# Patient Record
Sex: FEMALE | Race: OTHER | Employment: FULL TIME | URBAN - METROPOLITAN AREA
[De-identification: names, ages, dates, MRNs, and addresses within clinical notes are randomized per-mention and may not be internally consistent; named-entity substitution may affect disease eponyms.]

---

## 2017-03-06 ENCOUNTER — TELEPHONE (OUTPATIENT)
Dept: FAMILY MEDICINE CLINIC | Age: 32
End: 2017-03-06

## 2017-03-09 ENCOUNTER — OFFICE VISIT (OUTPATIENT)
Dept: FAMILY MEDICINE CLINIC | Age: 32
End: 2017-03-09

## 2017-03-09 VITALS
HEIGHT: 66 IN | BODY MASS INDEX: 34.55 KG/M2 | WEIGHT: 215 LBS | TEMPERATURE: 99.5 F | RESPIRATION RATE: 14 BRPM | SYSTOLIC BLOOD PRESSURE: 112 MMHG | DIASTOLIC BLOOD PRESSURE: 78 MMHG | HEART RATE: 74 BPM

## 2017-03-09 DIAGNOSIS — R68.89 FLU-LIKE SYMPTOMS: ICD-10-CM

## 2017-03-09 DIAGNOSIS — J01.90 ACUTE NON-RECURRENT SINUSITIS, UNSPECIFIED LOCATION: Primary | ICD-10-CM

## 2017-03-09 DIAGNOSIS — F99 INSOMNIA DUE TO OTHER MENTAL DISORDER: ICD-10-CM

## 2017-03-09 DIAGNOSIS — F51.05 INSOMNIA DUE TO OTHER MENTAL DISORDER: ICD-10-CM

## 2017-03-09 DIAGNOSIS — F17.200 TOBACCO USE DISORDER: ICD-10-CM

## 2017-03-09 LAB
INFLUENZA A ANTIBODY: NORMAL
INFLUENZA B ANTIBODY: NORMAL

## 2017-03-09 PROCEDURE — 87804 INFLUENZA ASSAY W/OPTIC: CPT | Performed by: FAMILY MEDICINE

## 2017-03-09 PROCEDURE — 99214 OFFICE O/P EST MOD 30 MIN: CPT | Performed by: FAMILY MEDICINE

## 2017-03-09 RX ORDER — TRAZODONE HYDROCHLORIDE 50 MG/1
50 TABLET ORAL NIGHTLY
Qty: 30 TABLET | Refills: 5 | Status: SHIPPED | OUTPATIENT
Start: 2017-03-09 | End: 2017-08-03 | Stop reason: SDUPTHER

## 2017-03-09 RX ORDER — CLARITHROMYCIN 500 MG/1
500 TABLET, COATED ORAL 2 TIMES DAILY
Qty: 20 TABLET | Refills: 0 | Status: SHIPPED | OUTPATIENT
Start: 2017-03-09 | End: 2017-03-19

## 2017-03-09 ASSESSMENT — ENCOUNTER SYMPTOMS
ALLERGIC/IMMUNOLOGIC NEGATIVE: 1
SINUS PRESSURE: 1
EYES NEGATIVE: 1
SORE THROAT: 1
GASTROINTESTINAL NEGATIVE: 1
ABDOMINAL PAIN: 0
SHORTNESS OF BREATH: 0
HOARSE VOICE: 1
SWOLLEN GLANDS: 0
COUGH: 1

## 2017-03-09 ASSESSMENT — PATIENT HEALTH QUESTIONNAIRE - PHQ9
2. FEELING DOWN, DEPRESSED OR HOPELESS: 0
SUM OF ALL RESPONSES TO PHQ9 QUESTIONS 1 & 2: 0
1. LITTLE INTEREST OR PLEASURE IN DOING THINGS: 0
SUM OF ALL RESPONSES TO PHQ QUESTIONS 1-9: 0

## 2017-08-03 ENCOUNTER — OFFICE VISIT (OUTPATIENT)
Dept: FAMILY MEDICINE CLINIC | Age: 32
End: 2017-08-03

## 2017-08-03 VITALS
OXYGEN SATURATION: 98 % | RESPIRATION RATE: 16 BRPM | BODY MASS INDEX: 35.03 KG/M2 | WEIGHT: 218 LBS | TEMPERATURE: 98.7 F | HEART RATE: 79 BPM | HEIGHT: 66 IN | DIASTOLIC BLOOD PRESSURE: 80 MMHG | SYSTOLIC BLOOD PRESSURE: 110 MMHG

## 2017-08-03 DIAGNOSIS — R42 VERTIGO: ICD-10-CM

## 2017-08-03 DIAGNOSIS — F51.01 PRIMARY INSOMNIA: Primary | ICD-10-CM

## 2017-08-03 DIAGNOSIS — F17.200 TOBACCO USE DISORDER: ICD-10-CM

## 2017-08-03 PROCEDURE — 99214 OFFICE O/P EST MOD 30 MIN: CPT | Performed by: FAMILY MEDICINE

## 2017-08-03 RX ORDER — TRAZODONE HYDROCHLORIDE 50 MG/1
50 TABLET ORAL NIGHTLY
Qty: 30 TABLET | Refills: 5 | Status: SHIPPED | OUTPATIENT
Start: 2017-08-03 | End: 2018-02-13 | Stop reason: SDUPTHER

## 2017-08-03 RX ORDER — MECLIZINE HYDROCHLORIDE 25 MG/1
25 TABLET ORAL 3 TIMES DAILY PRN
Qty: 60 TABLET | Refills: 5 | Status: SHIPPED | OUTPATIENT
Start: 2017-08-03 | End: 2018-02-13 | Stop reason: SDUPTHER

## 2017-08-03 RX ORDER — VARENICLINE TARTRATE 1 MG/1
1 TABLET, FILM COATED ORAL 2 TIMES DAILY
Qty: 60 TABLET | Refills: 2 | Status: SHIPPED | OUTPATIENT
Start: 2017-08-03 | End: 2018-08-29 | Stop reason: CLARIF

## 2017-08-03 ASSESSMENT — ENCOUNTER SYMPTOMS
GASTROINTESTINAL NEGATIVE: 1
ALLERGIC/IMMUNOLOGIC NEGATIVE: 1
RESPIRATORY NEGATIVE: 1
EYES NEGATIVE: 1
ABDOMINAL PAIN: 0

## 2017-10-31 ENCOUNTER — OFFICE VISIT (OUTPATIENT)
Dept: FAMILY MEDICINE CLINIC | Age: 32
End: 2017-10-31

## 2017-10-31 VITALS
BODY MASS INDEX: 35.68 KG/M2 | HEART RATE: 78 BPM | DIASTOLIC BLOOD PRESSURE: 66 MMHG | SYSTOLIC BLOOD PRESSURE: 112 MMHG | RESPIRATION RATE: 14 BRPM | WEIGHT: 222 LBS | HEIGHT: 66 IN | TEMPERATURE: 98.7 F

## 2017-10-31 DIAGNOSIS — H81.10 BENIGN PAROXYSMAL VERTIGO, UNSPECIFIED LATERALITY: ICD-10-CM

## 2017-10-31 DIAGNOSIS — J01.90 ACUTE NON-RECURRENT SINUSITIS, UNSPECIFIED LOCATION: Primary | ICD-10-CM

## 2017-10-31 DIAGNOSIS — J02.9 SORE THROAT: ICD-10-CM

## 2017-10-31 LAB — S PYO AG THROAT QL: NORMAL

## 2017-10-31 PROCEDURE — 87880 STREP A ASSAY W/OPTIC: CPT | Performed by: FAMILY MEDICINE

## 2017-10-31 PROCEDURE — 99213 OFFICE O/P EST LOW 20 MIN: CPT | Performed by: FAMILY MEDICINE

## 2017-10-31 PROCEDURE — 4004F PT TOBACCO SCREEN RCVD TLK: CPT | Performed by: FAMILY MEDICINE

## 2017-10-31 PROCEDURE — G8417 CALC BMI ABV UP PARAM F/U: HCPCS | Performed by: FAMILY MEDICINE

## 2017-10-31 PROCEDURE — G8484 FLU IMMUNIZE NO ADMIN: HCPCS | Performed by: FAMILY MEDICINE

## 2017-10-31 PROCEDURE — G8428 CUR MEDS NOT DOCUMENT: HCPCS | Performed by: FAMILY MEDICINE

## 2017-10-31 RX ORDER — DESOGESTREL-ETHINYL ESTRADIOL 0.15-0.03
TABLET ORAL
Refills: 11 | COMMUNITY
Start: 2017-09-24 | End: 2018-08-29

## 2017-10-31 RX ORDER — CEFDINIR 300 MG/1
300 CAPSULE ORAL 2 TIMES DAILY
Qty: 20 CAPSULE | Refills: 0 | Status: SHIPPED | OUTPATIENT
Start: 2017-10-31 | End: 2017-11-10

## 2017-10-31 ASSESSMENT — ENCOUNTER SYMPTOMS
SHORTNESS OF BREATH: 0
HOARSE VOICE: 0
COUGH: 0
SINUS PRESSURE: 1
SORE THROAT: 1
SINUS COMPLAINT: 1
SWOLLEN GLANDS: 1

## 2017-10-31 NOTE — PROGRESS NOTES
(ANTIVERT) 25 MG tablet Take 1 tablet by mouth 3 times daily as needed for Dizziness 60 tablet 5    varenicline (CHANTIX CONTINUING MONTH DREW) 1 MG tablet Take 1 tablet by mouth 2 times daily 60 tablet 2    ibuprofen (ADVIL;MOTRIN) 800 MG tablet Take 1 tablet by mouth every 8 hours as needed for Pain 90 tablet 11    norgestimate-ethinyl estradiol (ORTHO-CYCLEN) 0.25-35 MG-MCG per tablet Take 1 tablet by mouth daily      acyclovir (ZOVIRAX) 400 MG tablet Take 400 mg by mouth 3 times daily. No current facility-administered medications on file prior to visit. Objective    Vitals:    10/31/17 0939   BP: 112/66   Pulse: 78   Resp: 14   Temp: 98.7 °F (37.1 °C)   Weight: 222 lb (100.7 kg)   Height: 5' 6\" (1.676 m)     Physical Exam   Constitutional: Vital signs are normal. She appears well-developed and well-nourished. No distress. HENT:   Head: Normocephalic and atraumatic. Right Ear: External ear and ear canal normal. Tympanic membrane is not erythematous and not retracted. A middle ear effusion is present. Left Ear: External ear and ear canal normal. Tympanic membrane is not erythematous and not retracted. A middle ear effusion is present. Nose: Mucosal edema and rhinorrhea present. No septal deviation. Right sinus exhibits maxillary sinus tenderness and frontal sinus tenderness. Left sinus exhibits maxillary sinus tenderness and frontal sinus tenderness. Mouth/Throat: Uvula is midline and mucous membranes are normal. Uvula swelling present. Posterior oropharyngeal edema and posterior oropharyngeal erythema present. Oropharyngeal exudate: postnasal drainage. Eyes: Conjunctivae, EOM and lids are normal. Pupils are equal, round, and reactive to light. Neck: Trachea normal and normal range of motion. Neck supple. No JVD present. Carotid bruit is not present. No tracheal deviation present. No thyroid mass and no thyromegaly present.    Cardiovascular: Normal rate, regular rhythm, S1 normal, S2 normal, normal heart sounds, intact distal pulses and normal pulses. Pulmonary/Chest: Effort normal and breath sounds normal. No respiratory distress. She has no decreased breath sounds. She has no wheezes. She has no rhonchi. She has no rales. She exhibits no tenderness and no deformity. Abdominal: Soft. Normal appearance and bowel sounds are normal. She exhibits no distension. There is no splenomegaly or hepatomegaly. There is no tenderness. There is no rigidity, no rebound, no guarding and no CVA tenderness. No hernia. Lymphadenopathy:     She has no cervical adenopathy. Neurological: She is alert. She has normal strength. No cranial nerve deficit or sensory deficit. Coordination and gait normal.   Skin: Skin is warm, dry and intact. No rash noted. No erythema. Nursing note and vitals reviewed. Assessment & Plan   1. Acute non-recurrent sinusitis, unspecified location  cefdinir (OMNICEF) 300 MG capsule   2. Sore throat  POCT rapid strep A   3. Benign paroxysmal vertigo, unspecified laterality       Orders Placed This Encounter   Procedures    POCT rapid strep A     Orders Placed This Encounter   Medications    cefdinir (OMNICEF) 300 MG capsule     Sig: Take 1 capsule by mouth 2 times daily for 10 days     Dispense:  20 capsule     Refill:  0   Recommend OTC neilmed neti pot/ sinus rinse at least 4 times daily follow instructions on package  Foster maneuver on youtube  There are no discontinued medications. Ready to quit: Not Answered  Counseling given: Not Answered      Return if symptoms worsen or fail to improve.     Avtar Virk, DO

## 2018-02-13 ENCOUNTER — OFFICE VISIT (OUTPATIENT)
Dept: FAMILY MEDICINE CLINIC | Age: 33
End: 2018-02-13
Payer: COMMERCIAL

## 2018-02-13 VITALS
OXYGEN SATURATION: 98 % | HEART RATE: 78 BPM | RESPIRATION RATE: 16 BRPM | BODY MASS INDEX: 32.3 KG/M2 | DIASTOLIC BLOOD PRESSURE: 84 MMHG | TEMPERATURE: 98.1 F | WEIGHT: 201 LBS | SYSTOLIC BLOOD PRESSURE: 122 MMHG | HEIGHT: 66 IN

## 2018-02-13 DIAGNOSIS — F51.01 PRIMARY INSOMNIA: ICD-10-CM

## 2018-02-13 DIAGNOSIS — R10.11 RUQ PAIN: Primary | ICD-10-CM

## 2018-02-13 DIAGNOSIS — R42 VERTIGO: ICD-10-CM

## 2018-02-13 DIAGNOSIS — R11.2 NON-INTRACTABLE VOMITING WITH NAUSEA, UNSPECIFIED VOMITING TYPE: ICD-10-CM

## 2018-02-13 DIAGNOSIS — R10.11 RUQ PAIN: ICD-10-CM

## 2018-02-13 DIAGNOSIS — F17.200 TOBACCO USE DISORDER: ICD-10-CM

## 2018-02-13 LAB
ALBUMIN SERPL-MCNC: 4.2 G/DL (ref 3.9–4.9)
ALP BLD-CCNC: 86 U/L (ref 40–130)
ALT SERPL-CCNC: 19 U/L (ref 0–33)
AMYLASE: 60 U/L (ref 28–100)
ANION GAP SERPL CALCULATED.3IONS-SCNC: 14 MEQ/L (ref 7–13)
AST SERPL-CCNC: 11 U/L (ref 0–35)
BASOPHILS ABSOLUTE: 0.1 K/UL (ref 0–0.2)
BASOPHILS RELATIVE PERCENT: 1.2 %
BILIRUB SERPL-MCNC: 0.3 MG/DL (ref 0–1.2)
BUN BLDV-MCNC: 7 MG/DL (ref 6–20)
CALCIUM SERPL-MCNC: 8.9 MG/DL (ref 8.6–10.2)
CHLORIDE BLD-SCNC: 103 MEQ/L (ref 98–107)
CO2: 23 MEQ/L (ref 22–29)
CREAT SERPL-MCNC: 0.86 MG/DL (ref 0.5–0.9)
EOSINOPHILS ABSOLUTE: 0 K/UL (ref 0–0.7)
EOSINOPHILS RELATIVE PERCENT: 0.3 %
GFR AFRICAN AMERICAN: >60
GFR NON-AFRICAN AMERICAN: >60
GLOBULIN: 2.6 G/DL (ref 2.3–3.5)
GLUCOSE BLD-MCNC: 88 MG/DL (ref 74–109)
HCT VFR BLD CALC: 44.1 % (ref 37–47)
HEMOGLOBIN: 14.7 G/DL (ref 12–16)
LIPASE: 24 U/L (ref 13–60)
LYMPHOCYTES ABSOLUTE: 3 K/UL (ref 1–4.8)
LYMPHOCYTES RELATIVE PERCENT: 34.3 %
MCH RBC QN AUTO: 32 PG (ref 27–31.3)
MCHC RBC AUTO-ENTMCNC: 33.4 % (ref 33–37)
MCV RBC AUTO: 95.6 FL (ref 82–100)
MONOCYTES ABSOLUTE: 0.7 K/UL (ref 0.2–0.8)
MONOCYTES RELATIVE PERCENT: 8.5 %
NEUTROPHILS ABSOLUTE: 4.9 K/UL (ref 1.4–6.5)
NEUTROPHILS RELATIVE PERCENT: 55.7 %
PDW BLD-RTO: 12.8 % (ref 11.5–14.5)
PLATELET # BLD: 273 K/UL (ref 130–400)
POTASSIUM SERPL-SCNC: 4.7 MEQ/L (ref 3.5–5.1)
RBC # BLD: 4.61 M/UL (ref 4.2–5.4)
SODIUM BLD-SCNC: 140 MEQ/L (ref 132–144)
TOTAL PROTEIN: 6.8 G/DL (ref 6.4–8.1)
WBC # BLD: 8.7 K/UL (ref 4.8–10.8)

## 2018-02-13 PROCEDURE — 4004F PT TOBACCO SCREEN RCVD TLK: CPT | Performed by: FAMILY MEDICINE

## 2018-02-13 PROCEDURE — 99214 OFFICE O/P EST MOD 30 MIN: CPT | Performed by: FAMILY MEDICINE

## 2018-02-13 PROCEDURE — G8427 DOCREV CUR MEDS BY ELIG CLIN: HCPCS | Performed by: FAMILY MEDICINE

## 2018-02-13 PROCEDURE — G8417 CALC BMI ABV UP PARAM F/U: HCPCS | Performed by: FAMILY MEDICINE

## 2018-02-13 PROCEDURE — G8484 FLU IMMUNIZE NO ADMIN: HCPCS | Performed by: FAMILY MEDICINE

## 2018-02-13 RX ORDER — MECLIZINE HYDROCHLORIDE 25 MG/1
25 TABLET ORAL 3 TIMES DAILY PRN
Qty: 60 TABLET | Refills: 5 | Status: SHIPPED | OUTPATIENT
Start: 2018-02-13 | End: 2018-08-29 | Stop reason: CLARIF

## 2018-02-13 RX ORDER — TRAZODONE HYDROCHLORIDE 50 MG/1
50 TABLET ORAL NIGHTLY
Qty: 30 TABLET | Refills: 5 | Status: SHIPPED | OUTPATIENT
Start: 2018-02-13 | End: 2018-08-16 | Stop reason: SDUPTHER

## 2018-02-13 RX ORDER — IBUPROFEN 800 MG/1
800 TABLET ORAL EVERY 8 HOURS PRN
Qty: 90 TABLET | Refills: 11 | Status: SHIPPED | OUTPATIENT
Start: 2018-02-13 | End: 2018-08-29 | Stop reason: CLARIF

## 2018-02-13 RX ORDER — ONDANSETRON 4 MG/1
4 TABLET, FILM COATED ORAL DAILY PRN
Qty: 30 TABLET | Refills: 2 | Status: SHIPPED | OUTPATIENT
Start: 2018-02-13 | End: 2018-08-29 | Stop reason: CLARIF

## 2018-02-13 RX ORDER — NORGESTIMATE AND ETHINYL ESTRADIOL 0.25-0.035
1 KIT ORAL DAILY
Qty: 1 PACKET | Refills: 1 | Status: SHIPPED | OUTPATIENT
Start: 2018-02-13 | End: 2018-06-19 | Stop reason: SDUPTHER

## 2018-02-13 ASSESSMENT — ENCOUNTER SYMPTOMS
EYES NEGATIVE: 1
ALLERGIC/IMMUNOLOGIC NEGATIVE: 1
VISUAL CHANGE: 0
NAUSEA: 1
VOMITING: 1
CHANGE IN BOWEL HABIT: 0
SWOLLEN GLANDS: 0
COUGH: 0
RESPIRATORY NEGATIVE: 1
SORE THROAT: 0
ABDOMINAL PAIN: 1

## 2018-02-13 NOTE — PROGRESS NOTES
Differential    Comprehensive Metabolic Panel    Amylase    Lipase   5. Tobacco use disorder       Orders Placed This Encounter   Procedures    US GALLBLADDER RUQ     Standing Status:   Future     Standing Expiration Date:   2/13/2019    CBC Auto Differential     Standing Status:   Future     Standing Expiration Date:   2/13/2019    Comprehensive Metabolic Panel     Standing Status:   Future     Standing Expiration Date:   2/13/2019    Amylase     Standing Status:   Future     Standing Expiration Date:   2/13/2019    Lipase     Standing Status:   Future     Standing Expiration Date:   2/13/2019     Orders Placed This Encounter   Medications    traZODone (DESYREL) 50 MG tablet     Sig: Take 1 tablet by mouth nightly     Dispense:  30 tablet     Refill:  5    meclizine (ANTIVERT) 25 MG tablet     Sig: Take 1 tablet by mouth 3 times daily as needed for Dizziness     Dispense:  60 tablet     Refill:  5    norgestimate-ethinyl estradiol (ORTHO-CYCLEN) 0.25-35 MG-MCG per tablet     Sig: Take 1 tablet by mouth daily     Dispense:  1 packet     Refill:  1    ondansetron (ZOFRAN) 4 MG tablet     Sig: Take 1 tablet by mouth daily as needed for Nausea or Vomiting     Dispense:  30 tablet     Refill:  2     Medications Discontinued During This Encounter   Medication Reason    traZODone (DESYREL) 50 MG tablet Reorder    meclizine (ANTIVERT) 25 MG tablet Reorder    norgestimate-ethinyl estradiol (ORTHO-CYCLEN) 0.25-35 MG-MCG per tablet Reorder   smoking cessation discussed  Maintain clear liquids for at least 24 hrs after last vomiting episode then advance diet slowly as tolerated. Await labs will arrange for f/u as necessary but anticipate resolution over next few days    Ready to quit: No  Counseling given: Yes      Return in about 1 year (around 2/13/2019).     Jose Gallagher DO

## 2018-02-15 ENCOUNTER — HOSPITAL ENCOUNTER (OUTPATIENT)
Dept: ULTRASOUND IMAGING | Age: 33
Discharge: HOME | End: 2018-02-17
Payer: COMMERCIAL

## 2018-02-15 DIAGNOSIS — R11.2 NON-INTRACTABLE VOMITING WITH NAUSEA, UNSPECIFIED VOMITING TYPE: ICD-10-CM

## 2018-02-15 DIAGNOSIS — R10.11 RUQ PAIN: ICD-10-CM

## 2018-02-15 PROCEDURE — 76705 ECHO EXAM OF ABDOMEN: CPT

## 2018-06-19 RX ORDER — NORGESTIMATE AND ETHINYL ESTRADIOL 0.25-0.035
1 KIT ORAL DAILY
Qty: 1 PACKET | Refills: 1 | Status: SHIPPED | OUTPATIENT
Start: 2018-06-19 | End: 2018-08-29 | Stop reason: SDUPTHER

## 2018-06-19 RX ORDER — NORGESTIMATE AND ETHINYL ESTRADIOL 0.25-0.035
1 KIT ORAL DAILY
Qty: 1 PACKET | Refills: 1 | Status: CANCELLED | OUTPATIENT
Start: 2018-06-19

## 2018-08-16 DIAGNOSIS — F51.01 PRIMARY INSOMNIA: ICD-10-CM

## 2018-08-16 RX ORDER — TRAZODONE HYDROCHLORIDE 50 MG/1
50 TABLET ORAL NIGHTLY
Qty: 14 TABLET | Refills: 0 | Status: SHIPPED | OUTPATIENT
Start: 2018-08-16 | End: 2018-08-29 | Stop reason: SDUPTHER

## 2018-08-16 NOTE — TELEPHONE ENCOUNTER
Pt out of med. Has appt 8/29/18. Asking for short supply to get thru.     Pharm: [facility]

## 2018-08-17 RX ORDER — TRAZODONE HYDROCHLORIDE 50 MG/1
50 TABLET ORAL NIGHTLY
Qty: 30 TABLET | OUTPATIENT
Start: 2018-08-17

## 2018-08-20 ENCOUNTER — TELEPHONE (OUTPATIENT)
Dept: FAMILY MEDICINE CLINIC | Age: 33
End: 2018-08-20

## 2018-08-29 ENCOUNTER — OFFICE VISIT (OUTPATIENT)
Dept: FAMILY MEDICINE CLINIC | Age: 33
End: 2018-08-29
Payer: COMMERCIAL

## 2018-08-29 VITALS
HEART RATE: 78 BPM | BODY MASS INDEX: 36.16 KG/M2 | TEMPERATURE: 98.2 F | WEIGHT: 225 LBS | HEIGHT: 66 IN | SYSTOLIC BLOOD PRESSURE: 120 MMHG | RESPIRATION RATE: 16 BRPM | DIASTOLIC BLOOD PRESSURE: 70 MMHG

## 2018-08-29 DIAGNOSIS — F51.01 PRIMARY INSOMNIA: ICD-10-CM

## 2018-08-29 DIAGNOSIS — Z00.00 WELL ADULT EXAM: Primary | ICD-10-CM

## 2018-08-29 DIAGNOSIS — F17.200 TOBACCO USE DISORDER: ICD-10-CM

## 2018-08-29 DIAGNOSIS — N94.6 DYSMENORRHEA: ICD-10-CM

## 2018-08-29 PROCEDURE — 99395 PREV VISIT EST AGE 18-39: CPT | Performed by: FAMILY MEDICINE

## 2018-08-29 RX ORDER — TRAZODONE HYDROCHLORIDE 50 MG/1
50 TABLET ORAL NIGHTLY
Qty: 30 TABLET | Refills: 11 | Status: SHIPPED | OUTPATIENT
Start: 2018-08-29

## 2018-08-29 RX ORDER — NORGESTIMATE AND ETHINYL ESTRADIOL 0.25-0.035
1 KIT ORAL DAILY
Qty: 3 PACKET | Refills: 3 | Status: SHIPPED | OUTPATIENT
Start: 2018-08-29 | End: 2019-01-24 | Stop reason: SDUPTHER

## 2018-08-29 ASSESSMENT — PATIENT HEALTH QUESTIONNAIRE - PHQ9
1. LITTLE INTEREST OR PLEASURE IN DOING THINGS: 0
2. FEELING DOWN, DEPRESSED OR HOPELESS: 0
SUM OF ALL RESPONSES TO PHQ QUESTIONS 1-9: 0
SUM OF ALL RESPONSES TO PHQ9 QUESTIONS 1 & 2: 0
SUM OF ALL RESPONSES TO PHQ QUESTIONS 1-9: 0

## 2018-08-29 ASSESSMENT — ENCOUNTER SYMPTOMS
EYES NEGATIVE: 1
GASTROINTESTINAL NEGATIVE: 1
ALLERGIC/IMMUNOLOGIC NEGATIVE: 1
RESPIRATORY NEGATIVE: 1

## 2018-08-29 NOTE — PROGRESS NOTES
[address] [place], 28 y.o. female presents today with:  Chief Complaint   Patient presents with    Medication Refill     csm       Patient comes to the office today for routine yearly recheck but also needs refills on medications for dysmenorrhea and insomnia. She denies any significant side effects to medication and she reports they continue to work well. She continues to smoke about the same and does not state any desire to quit smoking. She states understanding and acceptance of risks. Review of Systems   Constitutional: Negative. HENT: Negative. Eyes: Negative. Respiratory: Negative. Cardiovascular: Negative. Gastrointestinal: Negative. Endocrine: Negative. Genitourinary: Negative. Musculoskeletal: Negative. Skin: Negative. Allergic/Immunologic: Negative. Neurological: Negative. Hematological: Negative. Psychiatric/Behavioral: Negative. No past medical history on file. Past Surgical History:   Procedure Laterality Date    TONSILLECTOMY  11     Social History     Social History    Marital status: Single     Spouse name: N/A    Number of children: N/A    Years of education: N/A     Occupational History    Not on file. Social History Main Topics    Smoking status: Current Every Day Smoker     Packs/day: 0.25     Types: Cigarettes    Smokeless tobacco: Never Used    Alcohol use Yes    Drug use: Unknown    Sexual activity: Not on file     Other Topics Concern    Not on file     Social History Narrative    No narrative on file     Family History   Problem Relation Age of Onset    Cancer Father         liver     No Known Allergies  No current outpatient prescriptions on file prior to visit. No current facility-administered medications on file prior to visit.         Objective    Vitals:    08/29/18 1545   BP: 120/70   Pulse: 78   Resp: 16   Temp: 98.2 °F (36.8 °C)   TempSrc: Tympanic   Weight: 225 lb (102.1 kg)   Height: 5' 6\" strength. No cranial nerve deficit or sensory deficit. Coordination and gait normal.   Skin: Skin is warm, dry and intact. No rash noted. No erythema. Psychiatric: She has a normal mood and affect. Her speech is normal and behavior is normal. Judgment and thought content normal. Her affect is not angry and not inappropriate. Cognition and memory are normal.   Nursing note and vitals reviewed. Assessment & Plan    Diagnosis Orders   1. Well adult exam     2. Primary insomnia  traZODone (DESYREL) 50 MG tablet   3. Dysmenorrhea  norgestimate-ethinyl estradiol (ORTHO-CYCLEN) 0.25-35 MG-MCG per tablet   4. Tobacco use disorder       No orders of the defined types were placed in this encounter. Orders Placed This Encounter   Medications    traZODone (DESYREL) 50 MG tablet     Sig: Take 1 tablet by mouth nightly     Dispense:  30 tablet     Refill:  11    norgestimate-ethinyl estradiol (ORTHO-CYCLEN) 0.25-35 MG-MCG per tablet     Sig: Take 1 tablet by mouth daily     Dispense:  3 packet     Refill:  3     Medications Discontinued During This Encounter   Medication Reason    acyclovir (ZOVIRAX) 400 MG tablet ERROR    ibuprofen (ADVIL;MOTRIN) 800 MG tablet ERROR    meclizine (ANTIVERT) 25 MG tablet ERROR    ondansetron (ZOFRAN) 4 MG tablet ERROR    varenicline (CHANTIX CONTINUING MONTH DREW) 1 MG tablet ERROR    traZODone (DESYREL) 50 MG tablet REORDER    norgestimate-ethinyl estradiol (ORTHO-CYCLEN) 0.25-35 MG-MCG per tablet REORDER    EMOQUETTE 0.15-30 MG-MCG per tablet LIST CLEANUP   smoking cessation discussed-patient stated understanding and acceptance of risks of continued tobacco use    Ready to quit: Not Answered  Counseling given: Not Answered      Return in about 1 year (around 8/29/2019).     Elvin Common, DO

## 2019-01-24 DIAGNOSIS — N94.6 DYSMENORRHEA: ICD-10-CM

## 2019-01-24 RX ORDER — NORGESTIMATE AND ETHINYL ESTRADIOL 0.25-0.035
1 KIT ORAL DAILY
Qty: 3 PACKET | Refills: 2 | Status: SHIPPED | OUTPATIENT
Start: 2019-01-24 | End: 2019-06-05 | Stop reason: SDUPTHER

## 2019-02-21 ENCOUNTER — OFFICE VISIT (OUTPATIENT)
Dept: FAMILY MEDICINE CLINIC | Age: 34
End: 2019-02-21
Payer: COMMERCIAL

## 2019-02-21 VITALS
BODY MASS INDEX: 36.32 KG/M2 | DIASTOLIC BLOOD PRESSURE: 80 MMHG | RESPIRATION RATE: 16 BRPM | HEART RATE: 89 BPM | WEIGHT: 226 LBS | TEMPERATURE: 99 F | HEIGHT: 66 IN | OXYGEN SATURATION: 98 % | SYSTOLIC BLOOD PRESSURE: 110 MMHG

## 2019-02-21 DIAGNOSIS — J01.90 ACUTE NON-RECURRENT SINUSITIS, UNSPECIFIED LOCATION: Primary | ICD-10-CM

## 2019-02-21 DIAGNOSIS — F17.200 TOBACCO USE DISORDER: ICD-10-CM

## 2019-02-21 PROCEDURE — 99213 OFFICE O/P EST LOW 20 MIN: CPT | Performed by: FAMILY MEDICINE

## 2019-02-21 RX ORDER — MECLIZINE HYDROCHLORIDE 25 MG/1
TABLET ORAL
Refills: 4 | COMMUNITY
Start: 2019-01-26 | End: 2019-03-19 | Stop reason: SDUPTHER

## 2019-02-21 RX ORDER — BENZONATATE 100 MG/1
100 CAPSULE ORAL 3 TIMES DAILY PRN
Qty: 30 CAPSULE | Refills: 0 | Status: SHIPPED | OUTPATIENT
Start: 2019-02-21 | End: 2019-02-28

## 2019-02-21 RX ORDER — IBUPROFEN 800 MG/1
TABLET ORAL
COMMUNITY
Start: 2019-01-26 | End: 2019-02-21 | Stop reason: SDUPTHER

## 2019-02-21 RX ORDER — CLARITHROMYCIN 500 MG/1
500 TABLET, COATED ORAL 2 TIMES DAILY
Qty: 20 TABLET | Refills: 0 | Status: SHIPPED | OUTPATIENT
Start: 2019-02-21 | End: 2019-03-03

## 2019-02-21 ASSESSMENT — ENCOUNTER SYMPTOMS
RHINORRHEA: 1
SHORTNESS OF BREATH: 0
WHEEZING: 0
COUGH: 1
SORE THROAT: 0
HEARTBURN: 0
HEMOPTYSIS: 0
GASTROINTESTINAL NEGATIVE: 1

## 2019-02-21 ASSESSMENT — PATIENT HEALTH QUESTIONNAIRE - PHQ9
SUM OF ALL RESPONSES TO PHQ QUESTIONS 1-9: 0
DEPRESSION UNABLE TO ASSESS: FUNCTIONAL CAPACITY MOTIVATION LIMITS ACCURACY
SUM OF ALL RESPONSES TO PHQ QUESTIONS 1-9: 0
2. FEELING DOWN, DEPRESSED OR HOPELESS: 0
1. LITTLE INTEREST OR PLEASURE IN DOING THINGS: 0
SUM OF ALL RESPONSES TO PHQ9 QUESTIONS 1 & 2: 0

## 2019-02-22 RX ORDER — IBUPROFEN 800 MG/1
TABLET ORAL
Qty: 90 TABLET | Refills: 0 | Status: SHIPPED | OUTPATIENT
Start: 2019-02-22

## 2019-03-19 RX ORDER — MECLIZINE HYDROCHLORIDE 25 MG/1
TABLET ORAL
Qty: 30 TABLET | Refills: 4 | Status: SHIPPED | OUTPATIENT
Start: 2019-03-19

## 2019-04-16 ENCOUNTER — HOSPITAL ENCOUNTER (EMERGENCY)
Age: 34
Discharge: HOME | End: 2019-04-16
Payer: COMMERCIAL

## 2019-04-16 VITALS
RESPIRATION RATE: 16 BRPM | BODY MASS INDEX: 34.55 KG/M2 | WEIGHT: 215 LBS | HEART RATE: 82 BPM | DIASTOLIC BLOOD PRESSURE: 60 MMHG | HEIGHT: 66 IN | TEMPERATURE: 98.6 F | OXYGEN SATURATION: 97 % | SYSTOLIC BLOOD PRESSURE: 94 MMHG

## 2019-04-16 DIAGNOSIS — R19.7 DIARRHEA, UNSPECIFIED TYPE: ICD-10-CM

## 2019-04-16 DIAGNOSIS — R11.2 NON-INTRACTABLE VOMITING WITH NAUSEA, UNSPECIFIED VOMITING TYPE: Primary | ICD-10-CM

## 2019-04-16 LAB
ALBUMIN SERPL-MCNC: 3.6 G/DL (ref 3.5–4.6)
ALP BLD-CCNC: 67 U/L (ref 40–130)
ALT SERPL-CCNC: 15 U/L (ref 0–33)
ANION GAP SERPL CALCULATED.3IONS-SCNC: 11 MEQ/L (ref 9–15)
AST SERPL-CCNC: 12 U/L (ref 0–35)
BACTERIA: NEGATIVE /HPF
BASOPHILS ABSOLUTE: 0 K/UL (ref 0–0.2)
BASOPHILS RELATIVE PERCENT: 0.1 %
BILIRUB SERPL-MCNC: 0.6 MG/DL (ref 0.2–0.7)
BILIRUBIN URINE: NEGATIVE
BLOOD, URINE: ABNORMAL
BUN BLDV-MCNC: 16 MG/DL (ref 6–20)
CALCIUM SERPL-MCNC: 7.7 MG/DL (ref 8.5–9.9)
CHLORIDE BLD-SCNC: 108 MEQ/L (ref 95–107)
CLARITY: CLEAR
CO2: 23 MEQ/L (ref 20–31)
COLOR: YELLOW
CREAT SERPL-MCNC: 0.76 MG/DL (ref 0.5–0.9)
EOSINOPHILS ABSOLUTE: 0 K/UL (ref 0–0.7)
EOSINOPHILS RELATIVE PERCENT: 0.1 %
EPITHELIAL CELLS, UA: ABNORMAL /HPF (ref 0–5)
GFR AFRICAN AMERICAN: >60
GFR NON-AFRICAN AMERICAN: >60
GLOBULIN: 2.6 G/DL (ref 2.3–3.5)
GLUCOSE BLD-MCNC: 121 MG/DL (ref 70–99)
GLUCOSE URINE: NEGATIVE MG/DL
HCT VFR BLD CALC: 46.7 % (ref 37–47)
HEMOGLOBIN: 16.1 G/DL (ref 12–16)
HYALINE CASTS: ABNORMAL /HPF (ref 0–5)
KETONES, URINE: 15 MG/DL
LEUKOCYTE ESTERASE, URINE: NEGATIVE
LYMPHOCYTES ABSOLUTE: 0.5 K/UL (ref 1–4.8)
LYMPHOCYTES RELATIVE PERCENT: 3.4 %
MCH RBC QN AUTO: 32.1 PG (ref 27–31.3)
MCHC RBC AUTO-ENTMCNC: 34.5 % (ref 33–37)
MCV RBC AUTO: 93.2 FL (ref 82–100)
MONOCYTES ABSOLUTE: 0.4 K/UL (ref 0.2–0.8)
MONOCYTES RELATIVE PERCENT: 2.3 %
NEUTROPHILS ABSOLUTE: 14.6 K/UL (ref 1.4–6.5)
NEUTROPHILS RELATIVE PERCENT: 94.1 %
NITRITE, URINE: NEGATIVE
PDW BLD-RTO: 12.8 % (ref 11.5–14.5)
PH UA: 7 (ref 5–9)
PLATELET # BLD: 265 K/UL (ref 130–400)
POTASSIUM SERPL-SCNC: 4.7 MEQ/L (ref 3.4–4.9)
PROTEIN UA: NEGATIVE MG/DL
RAPID INFLUENZA  B AGN: NEGATIVE
RAPID INFLUENZA A AGN: NEGATIVE
RBC # BLD: 5.01 M/UL (ref 4.2–5.4)
RBC UA: ABNORMAL /HPF (ref 0–5)
REASON FOR REJECTION: NORMAL
REJECTED TEST: NORMAL
SODIUM BLD-SCNC: 142 MEQ/L (ref 135–144)
SPECIFIC GRAVITY UA: 1.03 (ref 1–1.03)
TOTAL PROTEIN: 6.2 G/DL (ref 6.3–8)
URINE REFLEX TO CULTURE: YES
UROBILINOGEN, URINE: 0.2 E.U./DL
WBC # BLD: 15.5 K/UL (ref 4.8–10.8)
WBC UA: ABNORMAL /HPF (ref 0–5)

## 2019-04-16 PROCEDURE — 81001 URINALYSIS AUTO W/SCOPE: CPT

## 2019-04-16 PROCEDURE — 87086 URINE CULTURE/COLONY COUNT: CPT

## 2019-04-16 PROCEDURE — 2580000003 HC RX 258: Performed by: PHYSICIAN ASSISTANT

## 2019-04-16 PROCEDURE — 96374 THER/PROPH/DIAG INJ IV PUSH: CPT

## 2019-04-16 PROCEDURE — 87804 INFLUENZA ASSAY W/OPTIC: CPT

## 2019-04-16 PROCEDURE — 80053 COMPREHEN METABOLIC PANEL: CPT

## 2019-04-16 PROCEDURE — 36415 COLL VENOUS BLD VENIPUNCTURE: CPT

## 2019-04-16 PROCEDURE — 6360000002 HC RX W HCPCS: Performed by: PHYSICIAN ASSISTANT

## 2019-04-16 PROCEDURE — 96375 TX/PRO/DX INJ NEW DRUG ADDON: CPT

## 2019-04-16 PROCEDURE — 85025 COMPLETE CBC W/AUTO DIFF WBC: CPT

## 2019-04-16 PROCEDURE — 99283 EMERGENCY DEPT VISIT LOW MDM: CPT

## 2019-04-16 RX ORDER — 0.9 % SODIUM CHLORIDE 0.9 %
1000 INTRAVENOUS SOLUTION INTRAVENOUS ONCE
Status: COMPLETED | OUTPATIENT
Start: 2019-04-16 | End: 2019-04-16

## 2019-04-16 RX ORDER — KETOROLAC TROMETHAMINE 30 MG/ML
30 INJECTION, SOLUTION INTRAMUSCULAR; INTRAVENOUS ONCE
Status: COMPLETED | OUTPATIENT
Start: 2019-04-16 | End: 2019-04-16

## 2019-04-16 RX ORDER — ONDANSETRON 2 MG/ML
4 INJECTION INTRAMUSCULAR; INTRAVENOUS ONCE
Status: COMPLETED | OUTPATIENT
Start: 2019-04-16 | End: 2019-04-16

## 2019-04-16 RX ORDER — ONDANSETRON 4 MG/1
4 TABLET, FILM COATED ORAL EVERY 8 HOURS PRN
Qty: 20 TABLET | Refills: 0 | Status: SHIPPED | OUTPATIENT
Start: 2019-04-16

## 2019-04-16 RX ADMIN — KETOROLAC TROMETHAMINE 30 MG: 30 INJECTION, SOLUTION INTRAMUSCULAR at 08:23

## 2019-04-16 RX ADMIN — ONDANSETRON 4 MG: 2 INJECTION INTRAMUSCULAR; INTRAVENOUS at 08:23

## 2019-04-16 RX ADMIN — SODIUM CHLORIDE 1000 ML: 9 INJECTION, SOLUTION INTRAVENOUS at 08:23

## 2019-04-16 SDOH — HEALTH STABILITY: MENTAL HEALTH: HOW OFTEN DO YOU HAVE A DRINK CONTAINING ALCOHOL?: NEVER

## 2019-04-16 ASSESSMENT — ENCOUNTER SYMPTOMS
ABDOMINAL DISTENTION: 0
ABDOMINAL PAIN: 0
CONSTIPATION: 0
CHOKING: 0
SORE THROAT: 0
COLOR CHANGE: 0
RHINORRHEA: 0
STRIDOR: 0
WHEEZING: 0
DIARRHEA: 1
COUGH: 0
VOMITING: 1
SHORTNESS OF BREATH: 0
NAUSEA: 1
EYE DISCHARGE: 0

## 2019-04-16 ASSESSMENT — PAIN SCALES - GENERAL
PAINLEVEL_OUTOF10: 6
PAINLEVEL_OUTOF10: 8

## 2019-04-16 ASSESSMENT — PAIN DESCRIPTION - LOCATION: LOCATION: ABDOMEN

## 2019-04-16 NOTE — ED PROVIDER NOTES
[address] [facility] ED  eMERGENCY dEPARTMENT eNCOUnter      Pt Name: Karri Camargo  MRN: [id number]  [place] 1985  Date of evaluation: 4/16/2019  Provider: Augie Rome PA-C    CHIEF COMPLAINT       Chief Complaint   Patient presents with    Abdominal Pain     vomiting, diarrhea, cramps since 130 this morning         HISTORY OF PRESENT ILLNESS   (Location/Symptom, Timing/Onset,Context/Setting, Quality, Duration, Modifying Factors, Severity)  Note limiting factors. Karri Camargo is a 27 y.o. female who presents to the emergency department with a complaint of generalized body aches nausea diarrhea and abdominal cramping which patient states started approximately 1:30 AM this morning. She states yesterday she was fine eating and drinking well she denies any urinary complaints no fever no cough no shortness of breath. She states recent ill contacts with nephews, with similar symptoms. Patient rates her generalized body aches as a 8 out of 10. Total episodes of emesis most recent within the last 30 minutes. HPI    NursingNotes were reviewed. REVIEW OF SYSTEMS    (2-9 systems for level 4, 10 or more for level 5)     Review of Systems   Constitutional: Positive for chills and fatigue. Negative for activity change, appetite change, fever and unexpected weight change. HENT: Negative for congestion, ear discharge, ear pain, nosebleeds, rhinorrhea and sore throat. Eyes: Negative for discharge. Respiratory: Negative for cough, choking, shortness of breath, wheezing and stridor. Cardiovascular: Negative for chest pain, palpitations and leg swelling. Gastrointestinal: Positive for diarrhea, nausea and vomiting. Negative for abdominal distention, abdominal pain and constipation. Genitourinary: Negative for difficulty urinating and dysuria. Musculoskeletal: Positive for myalgias. Negative for arthralgias. Skin: Negative for color change, pallor, rash and wound.    Neurological: Negative for dizziness, tremors, syncope, weakness, numbness and headaches. Psychiatric/Behavioral: Negative for agitation and confusion. Except as noted above the remainder of the review of systems was reviewed and negative. PAST MEDICAL HISTORY     Past Medical History:   Diagnosis Date    Vertigo          SURGICALHISTORY       Past Surgical History:   Procedure Laterality Date    TONSILLECTOMY  1994         CURRENT MEDICATIONS       Discharge Medication List as of 4/16/2019  9:38 AM      CONTINUE these medications which have NOT CHANGED    Details   meclizine (ANTIVERT) 25 MG tablet TAKE 1 T PO TID PRN FOR DIZZINESS, Disp-30 tablet, R-4Normal      traZODone (DESYREL) 50 MG tablet Take 1 tablet by mouth nightly, Disp-30 tablet, R-11Normal      ibuprofen (ADVIL;MOTRIN) 800 MG tablet TAKE 1 TABLET BY MOUTH EVERY 8 HOURS AS NEEDED, Disp-90 tablet, R-0Normal      norgestimate-ethinyl estradiol (ORTHO-CYCLEN) 0.25-35 MG-MCG per tablet Take 1 tablet by mouth daily, Disp-3 packet, R-2Normal             ALLERGIES     Patient has no known allergies.     FAMILY HISTORY       Family History   Problem Relation Age of Onset    Cancer Father         liver          SOCIAL HISTORY       Social History     Socioeconomic History    Marital status: Single     Spouse name: None    Number of children: None    Years of education: None    Highest education level: None   Occupational History    None   Social Needs    Financial resource strain: None    Food insecurity:     Worry: None     Inability: None    Transportation needs:     Medical: None     Non-medical: None   Tobacco Use    Smoking status: Current Every Day Smoker     Packs/day: 0.25     Types: Cigarettes    Smokeless tobacco: Never Used   Substance and Sexual Activity    Alcohol use: Not Currently     Frequency: Never    Drug use: Never    Sexual activity: None   Lifestyle    Physical activity:     Days per week: None     Minutes per session: None  Stress: None   Relationships    Social connections:     Talks on phone: None     Gets together: None     Attends [employer] service: None     Active member of club or organization: None     Attends meetings of clubs or organizations: None     Relationship status: None    Intimate partner violence:     Fear of current or ex partner: None     Emotionally abused: None     Physically abused: None     Forced sexual activity: None   Other Topics Concern    None   Social History Narrative    None       SCREENINGS      @FLOW([id number])@      PHYSICAL EXAM    (up to 7 for level 4, 8 or more for level 5)     ED Triage Vitals [04/16/19 0749]   BP Temp Temp Source Pulse Resp SpO2 Height Weight   102/62 97.8 °F (36.6 °C) Temporal 107 20 97 % 5' 6\" (1.676 m) 215 lb (97.5 kg)       Physical Exam   Constitutional: She is oriented to person, place, and time. She appears well-developed and well-nourished. HENT:   Head: Normocephalic. Eyes: Pupils are equal, round, and reactive to light. EOM are normal.   Neck: Normal range of motion. Neck supple. No JVD present. No tracheal deviation present. Cardiovascular: Normal rate. Pulmonary/Chest: Effort normal and breath sounds normal. No respiratory distress. She has no wheezes. She has no rales. She exhibits no tenderness. Abdominal: Soft. Bowel sounds are normal. She exhibits no distension and no mass. There is no tenderness. There is no rebound and no guarding. Musculoskeletal: Normal range of motion. She exhibits no edema or deformity. Neurological: She is alert and oriented to person, place, and time. Coordination normal.   Psychiatric: She has a normal mood and affect.        DIAGNOSTIC RESULTS     EKG: All EKG's are interpreted by the Emergency Department Physician who either signs or Co-signsthis chart in the absence of a cardiologist.        RADIOLOGY:   Non-plain filmimages such as CT, Ultrasound and MRI are read by the radiologist. Plain radiographic images are

## 2019-04-18 LAB — URINE CULTURE, ROUTINE: NORMAL

## 2019-04-19 ENCOUNTER — TELEPHONE (OUTPATIENT)
Dept: FAMILY MEDICINE CLINIC | Age: 34
End: 2019-04-19

## 2019-04-19 NOTE — TELEPHONE ENCOUNTER
Spoke with patient.     Asked for and given information to schedule with Dr.[employer]  Advised patient to check with her insurance regarding coverage at [facility]

## 2019-06-05 ENCOUNTER — OFFICE VISIT (OUTPATIENT)
Dept: OBGYN CLINIC | Age: 34
End: 2019-06-05
Payer: COMMERCIAL

## 2019-06-05 VITALS
HEIGHT: 66 IN | HEART RATE: 64 BPM | SYSTOLIC BLOOD PRESSURE: 94 MMHG | DIASTOLIC BLOOD PRESSURE: 70 MMHG | BODY MASS INDEX: 35.36 KG/M2 | WEIGHT: 220 LBS

## 2019-06-05 DIAGNOSIS — Z11.51 SCREENING FOR HPV (HUMAN PAPILLOMAVIRUS): ICD-10-CM

## 2019-06-05 DIAGNOSIS — R68.82 LOW LIBIDO: ICD-10-CM

## 2019-06-05 DIAGNOSIS — N94.6 DYSMENORRHEA: ICD-10-CM

## 2019-06-05 DIAGNOSIS — Z01.419 VISIT FOR GYNECOLOGIC EXAMINATION: Primary | ICD-10-CM

## 2019-06-05 LAB
CORTISOL - PM: 9.7 UG/DL (ref 2.3–11.9)
FOLLICLE STIMULATING HORMONE: 7.7 MIU/ML
PROLACTIN: 13.5 NG/ML
T4 FREE: 1.11 NG/DL (ref 0.84–1.68)
TSH SERPL DL<=0.05 MIU/L-ACNC: 1.53 UIU/ML (ref 0.44–3.86)

## 2019-06-05 PROCEDURE — 99385 PREV VISIT NEW AGE 18-39: CPT | Performed by: OBSTETRICS & GYNECOLOGY

## 2019-06-05 RX ORDER — BUPROPION HYDROCHLORIDE 150 MG/1
150 TABLET ORAL EVERY MORNING
Qty: 30 TABLET | Refills: 0 | Status: SHIPPED | OUTPATIENT
Start: 2019-06-05

## 2019-06-05 RX ORDER — NORGESTIMATE AND ETHINYL ESTRADIOL 0.25-0.035
1 KIT ORAL DAILY
Qty: 3 PACKET | Refills: 3 | Status: SHIPPED | OUTPATIENT
Start: 2019-06-05 | End: 2020-05-18

## 2019-06-05 NOTE — PROGRESS NOTES
Chaperone for Intimate Exam    1. Was chaperone offered as part of the rooming process? offered, accepted   2. If Chaperone is declined by patient, NA: chaperone was available and exam completed  3.  Chaperone is [facility]

## 2019-06-05 NOTE — PROGRESS NOTES
Subjective:      Jah Vera is a 27 y. o.female  here for routine exam.  Current Complaints: normal menses, no abnormal bleeding, pelvic pain or discharge, no breast pain or new or enlarging lumps on self exam. Complains of low sexual desire for over 2 years. With same partner of 7 yr relationship. Denies any conflict in the relationship. Menstrual history:   regular menses 30 days, for 4-5 days   Sexual activity:  no, denies knowledge of risky exposure  Abnormal vaginal discharge:  No  Contraceptive method:  OCP (estrogen/progesterone)    Vitals:  BP 94/70 (Site: Left Upper Arm, Position: Sitting, Cuff Size: Large Adult)   Pulse 64   Ht 5' 6\" (1.676 m)   Wt 220 lb (99.8 kg)   LMP 2019   BMI 35.51 kg/m²   Allergies:  Patient has no known allergies.      Past Medical History:   Diagnosis Date    Vertigo      Past Surgical History:   Procedure Laterality Date    TONSILLECTOMY  11     Family History   Problem Relation Age of Onset    Cancer Father         liver     Social History     Socioeconomic History    Marital status: Single     Spouse name: Not on file    Number of children: Not on file    Years of education: Not on file    Highest education level: Not on file   Occupational History    Not on file   Social Needs    Financial resource strain: Not on file    Food insecurity:     Worry: Not on file     Inability: Not on file    Transportation needs:     Medical: Not on file     Non-medical: Not on file   Tobacco Use    Smoking status: Current Every Day Smoker     Packs/day: 0.25     Types: Cigarettes    Smokeless tobacco: Never Used   Substance and Sexual Activity    Alcohol use: Not Currently     Frequency: Never    Drug use: Never    Sexual activity: Not on file   Lifestyle    Physical activity:     Days per week: Not on file     Minutes per session: Not on file    Stress: Not on file   Relationships    Social connections:     Talks on phone: Not on file     Gets together: Not on file     Attends Congregational service: Not on file     Active member of club or organization: Not on file     Attends meetings of clubs or organizations: Not on file     Relationship status: Not on file    Intimate partner violence:     Fear of current or ex partner: Not on file     Emotionally abused: Not on file     Physically abused: Not on file     Forced sexual activity: Not on file   Other Topics Concern    Not on file   Social History Narrative    Not on file       Gynecologic History  Patient's last menstrual period was 2019. Last Pap: 2016  Results: normal  Last Mammogram: Not Indicated Results: N/A  FH breast cancer : negative     OB History        2    Para   0    Term                AB   2    Living           SAB        TAB        Ectopic        Molar        Multiple        Live Births                  Patient's medications, allergies, past medical, surgical, social and family histories were reviewed and updated as appropriate. Review of Systems  Review of Systems  Review of Systems   Constitutional: Negative for chills and fever. Respiratory: Negative for cough and shortnessof breath. Cardiovascular: Negative for chest pain and palpitations. Gastrointestinal: Negative for nausea and vomiting. Genitourinary: Negative for dysuria,frequency and urgency. Musculoskeletal: Negative for myalgias. Neurological: Negative for dizziness, seizures and headaches. Psychiatric/Behavioral: Negative for depression and suicidal ideas. All other systemsreviewed and are negative. Objective:     Vitals:  BP 94/70 (Site: Left Upper Arm, Position: Sitting, Cuff Size: Large Adult)   Pulse 64   Ht 5' 6\" (1.676 m)   Wt 220 lb (99.8 kg)   LMP 2019   BMI 35.51 kg/m²     Physical Exam  Physical Exam  Physical Exam   Constitutional: She is oriented to person,place, and time and well-developed, well-nourished, and in no distress.    HENT:   Head: Normocephalic and atraumatic. Eyes: Conjunctivae are normal. Pupils are equal, round, andreactive to light. Neck: Normal range of motion. Neck supple. Cardiovascular: Normal rate and regular rhythm. Pulmonary/Chest: Effort normal and breath soundsnormal. No respiratory distress. Right breast exhibits no inverted nipple, no mass, no nipple discharge, no skin change and no tenderness. Left breast exhibits no inverted nipple, no mass, no nipple discharge, no skin changeand no tenderness. Breasts are symmetrical.   Abdominal: Soft. Bowel sounds are normal.   Genitourinary: Vagina normal, uterus normal and cervix normal. No vaginal discharge found. Musculoskeletal: Normal range of motion. Neurological: She is alert and oriented to person, place, and time. She has normal reflexes. Psychiatric: Memory, affect and judgment normal.       Assessment:      Diagnosis Orders   1. Visit for gynecologic examination  PAP SMEAR    C. Trachomatis / N. Gonorrhoeae, DNA    Trichomonas Screen   2. Screening for HPV (human papillomavirus)  PAP SMEAR   3. Low libido  17-Hydroxyprogesterone    Androstenedione    Cortisol PM, Total    DHEA-Sulfate    T4, Free    TSH without Reflex    Prolactin    Testosterone Free and Total, Non-Male    Follicle Stimulating Hormone    Estrone   4. Dysmenorrhea  norgestimate-ethinyl estradiol (ORTHO-CYCLEN) 0.25-35 MG-MCG per tablet       Body mass index is 35.51 kg/m². Obesity:  Class II  Smoking:  Yes    Plan:   Pap smear : indicated:  performed. Breast exam : Normal  STD work up : indicated  Low libido . Obesity Counseling:  Given  Smoking Counseling:  Given   STD counseling: Discussed safe sexual practice in detail    Orders Placed This Encounter   Procedures    C. Trachomatis / N.  Gonorrhoeae, DNA     Standing Status:   Future     Standing Expiration Date:   6/4/2020    Trichomonas Screen     Standing Status:   Future     Standing Expiration Date:   6/4/2020    PAP SMEAR     Standing Status: Future     Standing Expiration Date:   6/4/2020     Order Specific Question:   Collection Type     Answer: Thin Prep     Order Specific Question:   Prior Abnormal Pap Test     Answer:   No     Order Specific Question:   Screening or Diagnostic     Answer:   Screening     Order Specific Question:   HPV Requested? Answer:   Yes     Order Specific Question:   High Risk Patient     Answer:   N/A    17-Hydroxyprogesterone     Standing Status:   Future     Standing Expiration Date:   6/4/2020    Androstenedione     Standing Status:   Future     Standing Expiration Date:   6/4/2020    Cortisol PM, Total     Standing Status:   Future     Standing Expiration Date:   6/4/2020    DHEA-Sulfate     Standing Status:   Future     Standing Expiration Date:   6/4/2020    T4, Free     Standing Status:   Future     Standing Expiration Date:   6/4/2020    TSH without Reflex     Standing Status:   Future     Standing Expiration Date:   6/4/2020    Prolactin     Standing Status:   Future     Standing Expiration Date:   6/4/2020    Testosterone Free and Total, Non-Male     Standing Status:   Future     Standing Expiration Date:   6/1/3901    Follicle Stimulating Hormone     Standing Status:   Future     Standing Expiration Date:   6/4/2020    Estrone     Standing Status:   Future     Standing Expiration Date:   6/5/2020       Orders Placed This Encounter   Medications    norgestimate-ethinyl estradiol (ORTHO-CYCLEN) 0.25-35 MG-MCG per tablet     Sig: Take 1 tablet by mouth daily     Dispense:  3 packet     Refill:  3    buPROPion (WELLBUTRIN XL) 150 MG extended release tablet     Sig: Take 1 tablet by mouth every morning     Dispense:  30 tablet     Refill:  0       Follow up:  No follow-ups on file.       Gorge Sorenson MD

## 2019-06-09 LAB
ANDROSTENEDIONE: 1.03 NG/ML (ref 0.26–2.14)
ESTRONE: 45.2 PG/ML
SEX HORMONE BINDING GLOBULIN: 57 NMOL/L (ref 30–135)
TESTOSTERONE FREE: 2.6 PG/ML (ref 1.3–9.2)
TESTOSTERONE, FEMALES/CHILDREN: 22 NG/DL (ref 9–55)

## 2019-06-10 LAB — DHEAS (DHEA SULFATE): 97.5 UG/DL (ref 45–270)

## 2019-06-12 ENCOUNTER — OFFICE VISIT (OUTPATIENT)
Dept: OBGYN CLINIC | Age: 34
End: 2019-06-12
Payer: COMMERCIAL

## 2019-06-12 VITALS
HEART RATE: 88 BPM | SYSTOLIC BLOOD PRESSURE: 100 MMHG | WEIGHT: 219 LBS | DIASTOLIC BLOOD PRESSURE: 64 MMHG | HEIGHT: 66 IN | BODY MASS INDEX: 35.2 KG/M2

## 2019-06-12 DIAGNOSIS — R68.82 LOW LIBIDO: Primary | ICD-10-CM

## 2019-06-12 DIAGNOSIS — Z01.419 VISIT FOR GYNECOLOGIC EXAMINATION: ICD-10-CM

## 2019-06-12 PROCEDURE — 99213 OFFICE O/P EST LOW 20 MIN: CPT | Performed by: OBSTETRICS & GYNECOLOGY

## 2019-06-12 NOTE — PROGRESS NOTES
Results Review      Norman Boland is a 27y.o. year old female here to discuss lab results. PREVIOUS VISIT: decreased libido , annual exam .     Vitals:  /64 (Site: Left Upper Arm, Position: Sitting, Cuff Size: Medium Adult)   Pulse 88   Ht 5' 6\" (1.676 m)   Wt 219 lb (99.3 kg)   LMP 2019   BMI 35.35 kg/m²   Allergies:  Patient has no known allergies.   Past Medical History:   Diagnosis Date    Vertigo      Past Surgical History:   Procedure Laterality Date    TONSILLECTOMY       OB History        2    Para   0    Term                AB   2    Living           SAB        TAB        Ectopic        Molar        Multiple        Live Births                     Family History   Problem Relation Age of Onset    Cancer Father         liver     Social History     Socioeconomic History    Marital status: Single     Spouse name: Not on file    Number of children: Not on file    Years of education: Not on file    Highest education level: Not on file   Occupational History    Not on file   Social Needs    Financial resource strain: Not on file    Food insecurity:     Worry: Not on file     Inability: Not on file    Transportation needs:     Medical: Not on file     Non-medical: Not on file   Tobacco Use    Smoking status: Current Every Day Smoker     Packs/day: 0.25     Types: Cigarettes    Smokeless tobacco: Never Used   Substance and Sexual Activity    Alcohol use: Not Currently     Frequency: Never    Drug use: Never    Sexual activity: Not on file   Lifestyle    Physical activity:     Days per week: Not on file     Minutes per session: Not on file    Stress: Not on file   Relationships    Social connections:     Talks on phone: Not on file     Gets together: Not on file     Attends Church service: Not on file     Active member of club or organization: Not on file     Attends meetings of clubs or organizations: Not on file     Relationship status: Not on file   [facility] Intimate partner violence:     Fear of current or ex partner: Not on file     Emotionally abused: Not on file     Physically abused: Not on file     Forced sexual activity: Not on file   Other Topics Concern    Not on file   Social History Narrative    Not on file       Contraceptive method:  none    Patient's medications, allergies, past medical, surgical,social and family histories were reviewed and updated as appropriate. Review of Systems  Review of Systems   All other systems reviewed and are negative. Physical exam :   General Appearance: alert and oriented to person, place and time, well-developedand well-nourished, in no acute distress  Skin: warm and dry, no rash or erythema  Eyes: pupils equal, round, and reactive to light, extraocular eye movements intact,conjunctivae normal  Neurological : A & O X 3   Pelvic: Deferred    Results:   Results for orders placed or performed in visit on 06/05/19   Estrone   Result Value Ref Range    Estrone 36.4 pg/mL   Follicle Stimulating Hormone   Result Value Ref Range    FSH 7.7 mIU/mL   Testosterone Free and Total, Non-Male   Result Value Ref Range    Testosterone, Females/Children 22 9 - 55 ng/dL    Testosterone, Free 2.6 1.3 - 9.2 pg/mL    Sex Hormone Binding 57 30 - 135 nmol/L   Prolactin   Result Value Ref Range    Prolactin 13.5 ng/mL   TSH without Reflex   Result Value Ref Range    TSH 1.530 0.440 - 3.860 uIU/mL   T4, Free   Result Value Ref Range    T4 Free 1.11 0.84 - 1.68 ng/dL   DHEA-Sulfate   Result Value Ref Range    DHEAS (DHEA Sulfate) 97.5 45 - 270 ug/dL   Cortisol PM, Total   Result Value Ref Range    Cortisol - PM 9.7 2.3 - 11.9 ug/dL   Androstenedione   Result Value Ref Range    Androstenedione 1.033 0.260 - 2.140 ng/mL      No results found. Assessment:      Diagnosis Orders   1. Low libido     2.  Visit for gynecologic examination            Plan:     Discussed starting wellbutrin for decreased libido   Discussed experimenting with testosterone preperations as it has shown possible improvement . Patient will call after 4 to 6 weeks from using wellbutrin to decide to order from [facility]   Flibanserin is FDA approved but still not covered by insurance. Advised vacation and leisure time as part of the treatment. PVU     No orders of the defined types were placed in this encounter. No orders of the defined types were placed in this encounter. Patient was seen with total face to face time of 15 minutes. More than 50% of this visit was counseling and education regarding The primary encounter diagnosis was Low libido. A diagnosis of Visit for gynecologic examination was also pertinent to this visit. and Follow-up (Labs done 6/5/19)   as well as  counseling on preventative health maintenance follow-up. Follow up:  Return if symptoms worsen or fail to improve.       Jenni Mac MD

## 2019-06-20 LAB — PAP SMEAR: NORMAL

## 2019-07-05 PROBLEM — Z01.419 VISIT FOR GYNECOLOGIC EXAMINATION: Status: RESOLVED | Noted: 2019-06-05 | Resolved: 2019-07-05

## 2019-07-05 PROBLEM — Z11.51 SCREENING FOR HPV (HUMAN PAPILLOMAVIRUS): Status: RESOLVED | Noted: 2019-06-05 | Resolved: 2019-07-05

## 2020-05-18 RX ORDER — NORGESTIMATE AND ETHINYL ESTRADIOL 0.25-0.035
KIT ORAL
Qty: 84 TABLET | Refills: 3 | Status: SHIPPED | OUTPATIENT
Start: 2020-05-18

## 2021-01-08 ENCOUNTER — APPOINTMENT (OUTPATIENT)
Dept: GENERAL RADIOLOGY | Age: 36
End: 2021-01-08
Payer: COMMERCIAL

## 2021-01-08 ENCOUNTER — HOSPITAL ENCOUNTER (EMERGENCY)
Age: 36
Discharge: HOME | End: 2021-01-08
Attending: EMERGENCY MEDICINE
Payer: COMMERCIAL

## 2021-01-08 VITALS
SYSTOLIC BLOOD PRESSURE: 157 MMHG | RESPIRATION RATE: 20 BRPM | HEIGHT: 66 IN | DIASTOLIC BLOOD PRESSURE: 98 MMHG | WEIGHT: 220 LBS | HEART RATE: 79 BPM | OXYGEN SATURATION: 99 % | BODY MASS INDEX: 35.36 KG/M2 | TEMPERATURE: 98.8 F

## 2021-01-08 DIAGNOSIS — K59.00 CONSTIPATION, UNSPECIFIED CONSTIPATION TYPE: ICD-10-CM

## 2021-01-08 DIAGNOSIS — R10.9 ABDOMINAL PAIN, UNSPECIFIED ABDOMINAL LOCATION: Primary | ICD-10-CM

## 2021-01-08 LAB
ALBUMIN SERPL-MCNC: 3.8 G/DL (ref 3.5–4.6)
ALP BLD-CCNC: 70 U/L (ref 40–130)
ALT SERPL-CCNC: 13 U/L (ref 0–33)
ANION GAP SERPL CALCULATED.3IONS-SCNC: 13 MEQ/L (ref 9–15)
AST SERPL-CCNC: 10 U/L (ref 0–35)
BASOPHILS ABSOLUTE: 0.1 K/UL (ref 0–0.2)
BASOPHILS RELATIVE PERCENT: 1 %
BILIRUB SERPL-MCNC: 0.4 MG/DL (ref 0.2–0.7)
BILIRUBIN URINE: NEGATIVE
BLOOD, URINE: NEGATIVE
BUN BLDV-MCNC: 7 MG/DL (ref 6–20)
CALCIUM SERPL-MCNC: 8.7 MG/DL (ref 8.5–9.9)
CHLORIDE BLD-SCNC: 101 MEQ/L (ref 95–107)
CHP ED QC CHECK: NORMAL
CLARITY: CLEAR
CO2: 22 MEQ/L (ref 20–31)
COLOR: YELLOW
CREAT SERPL-MCNC: 0.78 MG/DL (ref 0.5–0.9)
EOSINOPHILS ABSOLUTE: 0 K/UL (ref 0–0.7)
EOSINOPHILS RELATIVE PERCENT: 0.1 %
GFR AFRICAN AMERICAN: >60
GFR NON-AFRICAN AMERICAN: >60
GLOBULIN: 3 G/DL (ref 2.3–3.5)
GLUCOSE BLD-MCNC: 95 MG/DL (ref 70–99)
GLUCOSE URINE: NEGATIVE MG/DL
HCT VFR BLD CALC: 38.4 % (ref 37–47)
HEMOGLOBIN: 13.3 G/DL (ref 12–16)
KETONES, URINE: 15 MG/DL
LEUKOCYTE ESTERASE, URINE: NEGATIVE
LYMPHOCYTES ABSOLUTE: 2.5 K/UL (ref 1–4.8)
LYMPHOCYTES RELATIVE PERCENT: 23.7 %
MCH RBC QN AUTO: 32 PG (ref 27–31.3)
MCHC RBC AUTO-ENTMCNC: 34.5 % (ref 33–37)
MCV RBC AUTO: 92.7 FL (ref 82–100)
MONOCYTES ABSOLUTE: 0.6 K/UL (ref 0.2–0.8)
MONOCYTES RELATIVE PERCENT: 5.8 %
NEUTROPHILS ABSOLUTE: 7.3 K/UL (ref 1.4–6.5)
NEUTROPHILS RELATIVE PERCENT: 69.4 %
NITRITE, URINE: NEGATIVE
PDW BLD-RTO: 12.9 % (ref 11.5–14.5)
PH UA: 7 (ref 5–9)
PLATELET # BLD: 270 K/UL (ref 130–400)
POTASSIUM SERPL-SCNC: 4.1 MEQ/L (ref 3.4–4.9)
PREGNANCY TEST URINE, POC: NORMAL
PROTEIN UA: NEGATIVE MG/DL
RBC # BLD: 4.14 M/UL (ref 4.2–5.4)
SODIUM BLD-SCNC: 136 MEQ/L (ref 135–144)
SPECIFIC GRAVITY UA: 1.01 (ref 1–1.03)
TOTAL PROTEIN: 6.8 G/DL (ref 6.3–8)
UROBILINOGEN, URINE: 0.2 E.U./DL
WBC # BLD: 10.5 K/UL (ref 4.8–10.8)

## 2021-01-08 PROCEDURE — 96375 TX/PRO/DX INJ NEW DRUG ADDON: CPT

## 2021-01-08 PROCEDURE — 6370000000 HC RX 637 (ALT 250 FOR IP): Performed by: EMERGENCY MEDICINE

## 2021-01-08 PROCEDURE — 85025 COMPLETE CBC W/AUTO DIFF WBC: CPT

## 2021-01-08 PROCEDURE — 6360000002 HC RX W HCPCS: Performed by: EMERGENCY MEDICINE

## 2021-01-08 PROCEDURE — 2580000003 HC RX 258: Performed by: EMERGENCY MEDICINE

## 2021-01-08 PROCEDURE — 96374 THER/PROPH/DIAG INJ IV PUSH: CPT

## 2021-01-08 PROCEDURE — 99283 EMERGENCY DEPT VISIT LOW MDM: CPT

## 2021-01-08 PROCEDURE — 36415 COLL VENOUS BLD VENIPUNCTURE: CPT

## 2021-01-08 PROCEDURE — 81003 URINALYSIS AUTO W/O SCOPE: CPT

## 2021-01-08 PROCEDURE — 74018 RADEX ABDOMEN 1 VIEW: CPT

## 2021-01-08 PROCEDURE — 80053 COMPREHEN METABOLIC PANEL: CPT

## 2021-01-08 RX ORDER — DOCUSATE SODIUM 100 MG/1
100 CAPSULE, LIQUID FILLED ORAL 2 TIMES DAILY
Qty: 60 CAPSULE | Refills: 0 | Status: SHIPPED | OUTPATIENT
Start: 2021-01-08 | End: 2021-02-07

## 2021-01-08 RX ORDER — KETOROLAC TROMETHAMINE 30 MG/ML
30 INJECTION, SOLUTION INTRAMUSCULAR; INTRAVENOUS ONCE
Status: COMPLETED | OUTPATIENT
Start: 2021-01-08 | End: 2021-01-08

## 2021-01-08 RX ORDER — KETOROLAC TROMETHAMINE 10 MG/1
10 TABLET, FILM COATED ORAL EVERY 6 HOURS PRN
Qty: 20 TABLET | Refills: 0 | Status: SHIPPED | OUTPATIENT
Start: 2021-01-08

## 2021-01-08 RX ORDER — 0.9 % SODIUM CHLORIDE 0.9 %
1000 INTRAVENOUS SOLUTION INTRAVENOUS ONCE
Status: COMPLETED | OUTPATIENT
Start: 2021-01-08 | End: 2021-01-08

## 2021-01-08 RX ORDER — MORPHINE SULFATE 2 MG/ML
4 INJECTION, SOLUTION INTRAMUSCULAR; INTRAVENOUS
Status: DISCONTINUED | OUTPATIENT
Start: 2021-01-08 | End: 2021-01-08 | Stop reason: HOSPADM

## 2021-01-08 RX ORDER — ONDANSETRON 2 MG/ML
4 INJECTION INTRAMUSCULAR; INTRAVENOUS ONCE
Status: COMPLETED | OUTPATIENT
Start: 2021-01-08 | End: 2021-01-08

## 2021-01-08 RX ORDER — LACTULOSE 10 G/15ML
20 SOLUTION ORAL ONCE
Status: COMPLETED | OUTPATIENT
Start: 2021-01-08 | End: 2021-01-08

## 2021-01-08 RX ADMIN — LACTULOSE 20 G: 20 SOLUTION ORAL at 16:35

## 2021-01-08 RX ADMIN — KETOROLAC TROMETHAMINE 30 MG: 30 INJECTION, SOLUTION INTRAMUSCULAR at 16:35

## 2021-01-08 RX ADMIN — SODIUM CHLORIDE 1000 ML: 9 INJECTION, SOLUTION INTRAVENOUS at 16:36

## 2021-01-08 RX ADMIN — ONDANSETRON 4 MG: 2 INJECTION INTRAMUSCULAR; INTRAVENOUS at 16:35

## 2021-01-08 RX ADMIN — MAGNESIUM CITRATE 296 ML: 1.75 LIQUID ORAL at 16:35

## 2021-01-08 ASSESSMENT — ENCOUNTER SYMPTOMS
CONSTIPATION: 1
COUGH: 0
SORE THROAT: 0
ABDOMINAL PAIN: 1
DIARRHEA: 0
VOMITING: 0
NAUSEA: 0
BACK PAIN: 0
SHORTNESS OF BREATH: 0

## 2021-01-08 ASSESSMENT — PAIN DESCRIPTION - DESCRIPTORS: DESCRIPTORS: ACHING

## 2021-01-08 ASSESSMENT — PAIN DESCRIPTION - PROGRESSION: CLINICAL_PROGRESSION: GRADUALLY WORSENING

## 2021-01-08 ASSESSMENT — PAIN DESCRIPTION - PAIN TYPE: TYPE: ACUTE PAIN

## 2021-01-08 ASSESSMENT — PAIN DESCRIPTION - FREQUENCY: FREQUENCY: INTERMITTENT

## 2021-01-08 ASSESSMENT — PAIN DESCRIPTION - ONSET: ONSET: ON-GOING

## 2021-01-08 ASSESSMENT — PAIN DESCRIPTION - ORIENTATION: ORIENTATION: RIGHT;LEFT

## 2021-01-08 NOTE — ED NOTES
Dr. Loc Carbone at the bedside at this time. Assessment completed.       Alvin Zamudio RN  01/08/21 9827

## 2021-01-08 NOTE — ED PROVIDER NOTES
[facility] ED  eMERGENCYdEPARTMENT eNCOUnter      Pt Name: Nohelia Barillas  MRN: [id number]  [place] 1985  Date of evaluation: 1/8/2021  Aria Hennessy MD    CHIEF COMPLAINT           HPI  Nohelia Barillas is a 38 y.o. female per chart review has a h/o vertigo presents to the ED with ab pain, constipation. Pt notes gradual onset, moderate, intermittent, cramping, lower ab pain x 2 days. Pt notes she has not had a BM In 1 week. Pt denies fever, n/v, cp, sob, dysuria, diarrhea. Pt usually has a BM every day. ROS  Review of Systems   Constitutional: Negative for activity change, chills and fever. HENT: Negative for ear pain and sore throat. Eyes: Negative for visual disturbance. Respiratory: Negative for cough and shortness of breath. Cardiovascular: Negative for chest pain, palpitations and leg swelling. Gastrointestinal: Positive for abdominal pain and constipation. Negative for diarrhea, nausea and vomiting. Genitourinary: Negative for dysuria. Musculoskeletal: Negative for back pain. Skin: Negative for rash. Neurological: Negative for dizziness and weakness. Except as noted above the remainder of the review of systems was reviewed and negative.        PAST MEDICAL HISTORY     Past Medical History:   Diagnosis Date    Vertigo          SURGICAL HISTORY       Past Surgical History:   Procedure Laterality Date    TONSILLECTOMY  1994         CURRENTMEDICATIONS       Previous Medications    BUPROPION (WELLBUTRIN XL) 150 MG EXTENDED RELEASE TABLET    Take 1 tablet by mouth every morning    ESTARYLLA 0.25-35 MG-MCG PER TABLET    TAKE 1 TABLET BY MOUTH DAILY    IBUPROFEN (ADVIL;MOTRIN) 800 MG TABLET    TAKE 1 TABLET BY MOUTH EVERY 8 HOURS AS NEEDED    MECLIZINE (ANTIVERT) 25 MG TABLET    TAKE 1 T PO TID PRN FOR DIZZINESS    ONDANSETRON (ZOFRAN) 4 MG TABLET    Take 1 tablet by mouth every 8 hours as needed for Nausea    TRAZODONE (DESYREL) 50 MG TABLET    Take 1 tablet by mouth nightly       ALLERGIES     Patient has no known allergies. FAMILY HISTORY       Family History   Problem Relation Age of Onset    Cancer Father         liver          SOCIAL HISTORY       Social History     Socioeconomic History    Marital status: Single     Spouse name: None    Number of children: None    Years of education: None    Highest education level: None   Occupational History    None   Social Needs    Financial resource strain: None    Food insecurity     Worry: None     Inability: None    Transportation needs     Medical: None     Non-medical: None   Tobacco Use    Smoking status: Current Every Day Smoker     Packs/day: 0.25     Types: Cigarettes    Smokeless tobacco: Never Used   Substance and Sexual Activity    Alcohol use: Not Currently     Frequency: Never    Drug use: Never    Sexual activity: None   Lifestyle    Physical activity     Days per week: None     Minutes per session: None    Stress: None   Relationships    Social connections     Talks on phone: None     Gets together: None     Attends Voodoo service: None     Active member of club or organization: None     Attends meetings of clubs or organizations: None     Relationship status: None    Intimate partner violence     Fear of current or ex partner: None     Emotionally abused: None     Physically abused: None     Forced sexual activity: None   Other Topics Concern    None   Social History Narrative    None         PHYSICAL EXAM       ED Triage Vitals [01/08/21 1556]   BP Temp Temp Source Pulse Resp SpO2 Height Weight   (!) 157/98 98.8 °F (37.1 °C) Oral 79 20 99 % 5' 6\" (1.676 m) 220 lb (99.8 kg)       Physical Exam  Vitals signs and nursing note reviewed. Constitutional:       Appearance: She is well-developed. HENT:      Head: Normocephalic.       Right Ear: External ear normal.      Left Ear: External ear normal.   Eyes:      Conjunctiva/sclera: Conjunctivae normal.      Pupils: Pupils are equal, round, and reactive to light. Neck:      Musculoskeletal: Normal range of motion and neck supple. Cardiovascular:      Rate and Rhythm: Normal rate and regular rhythm. Heart sounds: Normal heart sounds. Pulmonary:      Effort: Pulmonary effort is normal.      Breath sounds: Normal breath sounds. Abdominal:      General: Bowel sounds are normal. There is no distension. Palpations: Abdomen is soft. Tenderness: There is abdominal tenderness in the right lower quadrant, suprapubic area and left lower quadrant. There is no guarding or rebound. Musculoskeletal: Normal range of motion. Skin:     General: Skin is warm and dry. Neurological:      Mental Status: She is alert and oriented to person, place, and time. Psychiatric:         Mood and Affect: Mood normal.           MDM  37 yo female presents to the ED with ab pain, constipation. Pt is afebrile, hemodynamically stable. Pt given 1 L NS, IV zofran, IV toradol with moderate relief. Pt also given PO Mg citrate, PO lactulose in the ED. Labs unremarkable. KUB shows constipation. Pt reassessed and doing well. Pt had a BM in the ED and feeling much better. Pt notes eating lots of cheese lately. Pt educated about constipation and diet. Pt given ab pain, constipation warning signs, prescription for toradol and colace. Pt understands plan. Pt understands plan. FINAL IMPRESSION      1. Abdominal pain, unspecified abdominal location    2.  Constipation, unspecified constipation type          DISPOSITION/PLAN   DISPOSITION Decision To Discharge 01/08/2021 05:51:51 PM        DISCHARGE MEDICATIONS:  [unfilled]         Zackery Lam MD(electronically signed)  Attending Emergency Physician            Zackery Lam MD  01/08/21 [phone]

## 2021-01-08 NOTE — ED NOTES
Labs and urine obtained by this RN, labeled and sent to lab via tube system.       Felisa Torres RN  01/08/21 3220

## 2021-01-08 NOTE — ED TRIAGE NOTES
Patient arrived from home via family with abd pain and constipation. patient has been taking miralax with no relief. Patient A&OX4. Skin pink, warm, and dry.

## 2023-03-21 ENCOUNTER — OFFICE VISIT (OUTPATIENT)
Dept: PRIMARY CARE | Facility: CLINIC | Age: 38
End: 2023-03-21
Payer: COMMERCIAL

## 2023-03-21 VITALS
DIASTOLIC BLOOD PRESSURE: 66 MMHG | OXYGEN SATURATION: 99 % | HEART RATE: 78 BPM | SYSTOLIC BLOOD PRESSURE: 104 MMHG | HEIGHT: 66 IN | RESPIRATION RATE: 16 BRPM | BODY MASS INDEX: 36.96 KG/M2 | WEIGHT: 230 LBS

## 2023-03-21 DIAGNOSIS — J00 NASOPHARYNGITIS: ICD-10-CM

## 2023-03-21 DIAGNOSIS — J34.89 NASAL CONGESTION WITH RHINORRHEA: ICD-10-CM

## 2023-03-21 DIAGNOSIS — J01.40 ACUTE NON-RECURRENT PANSINUSITIS: Primary | ICD-10-CM

## 2023-03-21 DIAGNOSIS — R05.9 COUGH IN ADULT PATIENT: ICD-10-CM

## 2023-03-21 DIAGNOSIS — R09.81 NASAL CONGESTION WITH RHINORRHEA: ICD-10-CM

## 2023-03-21 PROCEDURE — 99213 OFFICE O/P EST LOW 20 MIN: CPT | Performed by: NURSE PRACTITIONER

## 2023-03-21 RX ORDER — CEFDINIR 300 MG/1
300 CAPSULE ORAL 2 TIMES DAILY
Qty: 20 CAPSULE | Refills: 0 | Status: SHIPPED | OUTPATIENT
Start: 2023-03-21 | End: 2023-03-31

## 2023-03-21 RX ORDER — IBUPROFEN 800 MG/1
800 TABLET ORAL 3 TIMES DAILY
COMMUNITY
End: 2023-07-07 | Stop reason: SDUPTHER

## 2023-03-21 RX ORDER — NORGESTIMATE AND ETHINYL ESTRADIOL 0.25-0.035
1 KIT ORAL DAILY
COMMUNITY
End: 2024-04-11 | Stop reason: ALTCHOICE

## 2023-03-21 RX ORDER — MECLIZINE HYDROCHLORIDE 25 MG/1
1 TABLET ORAL 3 TIMES DAILY PRN
COMMUNITY
Start: 2022-10-03 | End: 2023-05-25 | Stop reason: SDUPTHER

## 2023-03-21 RX ORDER — TRAZODONE HYDROCHLORIDE 150 MG/1
150 TABLET ORAL NIGHTLY
COMMUNITY
Start: 2023-02-20 | End: 2023-07-07 | Stop reason: SDUPTHER

## 2023-03-21 NOTE — PROGRESS NOTES
Subjective   Patient ID: Ruma Barrios is a 38 y.o. female who is with a chief complaint of symptoms of respiratory tract infection.     HPI  Patient is a 38 y.o. female who CONSULTED AT OFFICE CLINIC today. Patient is with complaints of nasal congestion, nasal discharge, headache / sinus pain, sore throat, painful swallowing, post nasal drip, and fatigue. She denies having cough, muscle ache, loss of sense of taste, loss of sense of smell, diarrhea, chills nor fever. Patient states that present condition started about 1 MONTH days ago with some days symptoms were better. Patient denies history of recent travel, exposure to person/people who tested positive for COVID 19, nor exposure to person/people with flu like symptoms. she denies shortness of breath, chest pain, palpitations, nor edema.  She [facility] on 3/[facility]/23 and was given 10 days of Amoxicillin. She stated that she  tried OTC medications which afforded only slight relief of symptoms. she denies nausea, vomiting, abdominal pain, nor any other symptoms.    Patient states she had her COVID vaccine.  Patient states she have not yet received flu shot for this season.    Patient states that she opted to have no COVID test requested at this time. she states that she will do COVID test by [facility] depending on symptoms progression.    Review of Systems  General: no weight loss, generally healthy, (+) fatigue  Head: (+) headaches / sinus pain, no vertigo, no injury  Eyes: no diplopia, no tearing, no pain,   Ears: no change in hearing, no tinnitus, no bleeding, no vertigo  Mouth:  no dental difficulties, no gingival bleeding, (+) sore throat, no loss of sense of taste, (+) painful swallowing, (+) post nasal drip,   Nose: (+) congestion, (+) discharge, no bleeding, no obstruction, no loss of sense of smell  Neck: no stiffness, no pain, no tenderness, no masses, no bruit  Pulmonary: no dyspnea, no wheezing, no hemoptysis, no cough  Cardiovascular:  no chest pain, no palpitations, no syncope, no orthopnea  Gastrointestinal: no change in appetite, no dysphagia, no abdominal pains, no diarrhea, no emesis, no melena  Genito Urinary: no dysuria, no urinary urgency, no nocturia, no incontinence, no change in nature of urine  Musculoskeletal: no muscle ache, no joint pain, no limitation of range of motion, no paresthesia, no numbness  Constitutional: no fever, no chills, no night sweats    Objective   Physical Exam  General: ambulatory, in no acute distress  Head: normocephalic, no lesions, (+) sinus tenderness  Eyes: pink palpebral conjunctiva, anicteric sclerae, PERRLA, EOM's full  Ears: clear external auditory canals, no ear discharge, no bleeding from the ears, tympanic membrane intact  Nose: (+) congested nasal mucosa, (+) yellow mucoid nasal discharge, no bleeding, no obstruction  Throat: (+) erythema, and (+) exudate on posterior pharyngeal wall, no lesion  Neck: supple, no masses, no bruits, no CLADP  Chest: symmetrical chest expansion, no lagging, no retractions, clear breath sounds, no rales, no wheezes    Assessment/Plan   Problem List Items Addressed This Visit    None  Visit Diagnoses       Acute non-recurrent pansinusitis    -  Primary    Relevant Medications    cefdinir (Omnicef) 300 mg capsule    Cough in adult patient        Relevant Medications    cefdinir (Omnicef) 300 mg capsule    Nasal congestion with rhinorrhea        Relevant Medications    cefdinir (Omnicef) 300 mg capsule    Nasopharyngitis        Relevant Medications    cefdinir (Omnicef) 300 mg capsule        DISCHARGE SUMMARY:   Patient was seen and examined. Diagnosis, treatment, treatment options, and possible complications of today's illness discussed and explained to patient. Patient to take medication/s associated with this visit. Patient may take OTC decongestant of choice as needed. Patient may also take OTC analgesic/antipyretic if needed for pain/fever. Advised to increase oral  fluid intake. Advised steam inhalation if needed to relieve congestion. Advised warm saline gargle if needed to relieve throat discomfort. Advised to come back if with worsening or persistent symptoms. Patient verbalized understanding of plan of care.    Patient to come back in 7 - 10 days if needed for worsening symptoms.

## 2023-03-21 NOTE — PROGRESS NOTES
"Subjective   Patient ID: Ruma Barrios is a 38 y.o. female who presents for Sore Throat, Headache, and Nasal Congestion. Patient is having symptoms since Feb 18, 2023.   She went to a [facility] on 3/2/23 and was given 10 days of Amoxicillin.   She ended the course on 3/13/23 but symptoms returned on 3/16/23.   She is currently taking Cold and Flu medication and Ibuprofen.     HPI     Review of Systems    Objective   /66 (BP Location: Left arm, Patient Position: Sitting, BP Cuff Size: Adult)   Pulse 78   Resp 16   Ht 1.676 m (5' 6\")   Wt 104 kg (230 lb)   SpO2 99%   BMI 37.12 kg/m²     Physical Exam    Assessment/Plan          "

## 2023-03-23 NOTE — PATIENT INSTRUCTIONS
DISCHARGE SUMMARY:   Patient was seen and examined. Diagnosis, treatment, treatment options, and possible complications of today's illness discussed and explained to patient. Patient to take medication/s associated with this visit. Patient may take OTC decongestant of choice as needed. Patient may also take OTC analgesic/antipyretic if needed for pain/fever. Advised to increase oral fluid intake. Advised steam inhalation if needed to relieve congestion. Advised warm saline gargle if needed to relieve throat discomfort. Advised to come back if with worsening or persistent symptoms. Patient verbalized understanding of plan of care.    Patient to come back in 7 - 10 days if needed for worsening symptoms.

## 2023-05-25 DIAGNOSIS — J34.89 NASAL CONGESTION WITH RHINORRHEA: Primary | ICD-10-CM

## 2023-05-25 DIAGNOSIS — R09.81 NASAL CONGESTION WITH RHINORRHEA: Primary | ICD-10-CM

## 2023-05-25 RX ORDER — MECLIZINE HYDROCHLORIDE 25 MG/1
25 TABLET ORAL 3 TIMES DAILY PRN
Qty: 30 TABLET | Refills: 0 | Status: SHIPPED | OUTPATIENT
Start: 2023-05-25 | End: 2023-07-07 | Stop reason: SDUPTHER

## 2023-05-25 NOTE — TELEPHONE ENCOUNTER
patient unable to get appt till July 15 was wondering if you would send a script to cover her vertigo till then ... Meclizine 25 mg to [facility]

## 2023-06-23 PROBLEM — G47.00 INSOMNIA: Status: ACTIVE | Noted: 2023-06-23

## 2023-06-23 NOTE — PROGRESS NOTES
Subjective   Patient ID: Ruma Barrios is a 38 y.o. female who presents for CSM (Trazadone) and Annual Exam.    Well Adult Physical   Patient here for a comprehensive physical exam.The patient reports problems - vertigo, weight    Do you take any herbs or supplements that were not prescribed by a doctor? no   Are you taking calcium supplements? no   Are you taking aspirin daily? no     History:  LMP:   Last pap date:   Abnormal pap? no  : 2  Para: 0        Dizziness  This is a chronic problem. The current episode started more than 1 year ago. The problem occurs intermittently. The problem has been unchanged. Associated symptoms include headaches, nausea and vertigo. Pertinent negatives include no abdominal pain, arthralgias, chest pain, chills, congestion, coughing, diaphoresis, fatigue, fever, joint swelling, myalgias, neck pain, numbness, rash, sore throat, vomiting or weakness. The symptoms are aggravated by bending (head turning to left). She has tried acetaminophen and NSAIDs (meclizine) for the symptoms. The treatment provided moderate relief.        Review of Systems   Constitutional:  Negative for activity change, appetite change, chills, diaphoresis, fatigue, fever and unexpected weight change.   HENT:  Negative for congestion, ear pain, hearing loss, nosebleeds, postnasal drip, rhinorrhea, sinus pressure, sneezing, sore throat, tinnitus, trouble swallowing and voice change.    Eyes:  Negative for photophobia, pain, discharge, redness, itching and visual disturbance.   Respiratory:  Negative for cough, choking, chest tightness, shortness of breath and wheezing.    Cardiovascular:  Negative for chest pain, palpitations and leg swelling.   Gastrointestinal:  Positive for nausea. Negative for abdominal distention, abdominal pain, blood in stool, constipation, diarrhea and vomiting.   Endocrine: Negative for cold intolerance, heat intolerance, polydipsia and polyuria.   Genitourinary:   "Negative for dysuria, flank pain, frequency, hematuria and urgency.   Musculoskeletal:  Negative for arthralgias, back pain, joint swelling, myalgias, neck pain and neck stiffness.   Skin:  Negative for rash and wound.   Allergic/Immunologic: Negative for immunocompromised state.   Neurological:  Positive for dizziness, vertigo and headaches. Negative for tremors, seizures, syncope, facial asymmetry, speech difficulty, weakness, light-headedness and numbness.   Hematological:  Negative for adenopathy. Does not bruise/bleed easily.   Psychiatric/Behavioral:  Negative for agitation, behavioral problems, confusion, dysphoric mood, hallucinations, self-injury, sleep disturbance and suicidal ideas. The patient is not nervous/anxious.        Objective   /79 (BP Location: Left arm, Patient Position: Sitting, BP Cuff Size: Large adult)   Pulse 85   Temp 36.5 °C (97.7 °F) (Temporal)   Resp 18   Ht 1.676 m (5' 6\")   Wt 105 kg (231 lb)   SpO2 96%   BMI 37.28 kg/m²     Physical Exam  Constitutional:       General: She is not in acute distress.     Appearance: She is not ill-appearing or diaphoretic.   HENT:      Head: Normocephalic and atraumatic.      Right Ear: External ear normal.      Left Ear: External ear normal.      Nose: Nose normal. No rhinorrhea.   Eyes:      General: Lids are normal. No scleral icterus.        Right eye: No discharge.         Left eye: No discharge.      Conjunctiva/sclera: Conjunctivae normal.   Cardiovascular:      Rate and Rhythm: Normal rate and regular rhythm.      Pulses: Normal pulses.      Heart sounds: No murmur heard.  Pulmonary:      Effort: Pulmonary effort is normal. No respiratory distress.      Breath sounds: No decreased breath sounds, wheezing, rhonchi or rales.   Abdominal:      General: Bowel sounds are normal. There is no distension.      Palpations: Abdomen is soft. There is no mass.      Tenderness: There is no abdominal tenderness. There is no guarding or rebound. "   Musculoskeletal:         General: No swelling, tenderness or deformity.      Cervical back: No rigidity or tenderness.      Right lower leg: No edema.      Left lower leg: No edema.   Lymphadenopathy:      Cervical: No cervical adenopathy.      Upper Body:      Right upper body: No supraclavicular adenopathy.      Left upper body: No supraclavicular adenopathy.   Skin:     General: Skin is warm and dry.      Coloration: Skin is not jaundiced or pale.      Findings: No erythema, lesion or rash.   Neurological:      General: No focal deficit present.      Mental Status: She is alert and oriented to person, place, and time.      Sensory: No sensory deficit.      Motor: No weakness or tremor.      Coordination: Coordination normal.      Gait: Gait normal.   Psychiatric:         Mood and Affect: Mood normal. Affect is not inappropriate.         Behavior: Behavior normal.         Assessment/Plan   Diagnoses and all orders for this visit:  Routine general medical examination at a health care facility  -     CBC and Auto Differential; Future  -     Comprehensive Metabolic Panel; Future  -     Lipid Panel; Future  -     Magnesium; Future  -     TSH with reflex to Free T4 if abnormal; Future  -     cholecalciferol (Vitamin D3) 50 mcg (2,000 unit) capsule; Take 1 capsule (50 mcg) by mouth once daily.  -     Follow Up In [facility] - PCP - Established; Future  Vertigo  -     meclizine (ANTIVERT) 50 mg tablet; Take 1 tablet (50 mg) by mouth 3 times a day as needed for dizziness or nausea.  -     Follow Up In [facility] - PCP - Established; Future  Primary insomnia  -     traZODone (Desyrel) 150 mg tablet; Take 1 tablet (150 mg) by mouth once daily at bedtime.  -     Follow Up In [facility] - PCP - Established; Future  Nausea  -     ondansetron ODT (Zofran-ODT) 4 mg disintegrating tablet; Take 1 tablet (4 mg) by mouth every 8 hours if needed for nausea or vomiting.  -     Follow Up In [facility] - PCP - Established; Future  Dysmenorrhea  -     ibuprofen 800 mg tablet; Take 1 tablet (800 mg) by mouth 3 times a day.  -     CBC and Auto Differential; Future  -     Magnesium; Future  -     Follow Up In [facility] - PCP - Established; Future  Class 2 obesity due to excess calories without serious comorbidity with body mass index (BMI) of 37.0 to 37.9 in adult  -     semaglutide, weight loss, (Wegovy) 0.25 mg/0.5 mL pen injector; Inject 0.25 mg under the skin every 7 days.  -     CBC and Auto Differential; Future  -     Comprehensive Metabolic Panel; Future  -     Lipid Panel; Future  -     Magnesium; Future  -     TSH with reflex to Free T4 if abnormal; Future  -     Follow Up In [facility] - PCP - Established; Future  Seasonal allergic rhinitis due to pollen  -     fluticasone (Flonase) 50 mcg/actuation nasal spray; Administer 2 sprays into each nostril once daily. Shake gently. Before first use, prime pump. After use, clean tip and replace cap.  -     cetirizine (ZyrTEC) 10 mg tablet; Take 1 tablet (10 mg) by mouth once daily.  If increased dose of meclizine does not give desired improvement in vertigo symptoms that we will arrange for vestibular rehab.

## 2023-07-07 ENCOUNTER — OFFICE VISIT (OUTPATIENT)
Dept: PRIMARY CARE | Facility: CLINIC | Age: 38
End: 2023-07-07
Payer: COMMERCIAL

## 2023-07-07 VITALS
HEIGHT: 66 IN | OXYGEN SATURATION: 96 % | WEIGHT: 231 LBS | SYSTOLIC BLOOD PRESSURE: 109 MMHG | BODY MASS INDEX: 37.12 KG/M2 | TEMPERATURE: 97.7 F | HEART RATE: 85 BPM | RESPIRATION RATE: 18 BRPM | DIASTOLIC BLOOD PRESSURE: 79 MMHG

## 2023-07-07 DIAGNOSIS — F51.01 PRIMARY INSOMNIA: ICD-10-CM

## 2023-07-07 DIAGNOSIS — E66.09 CLASS 2 OBESITY DUE TO EXCESS CALORIES WITHOUT SERIOUS COMORBIDITY WITH BODY MASS INDEX (BMI) OF 37.0 TO 37.9 IN ADULT: ICD-10-CM

## 2023-07-07 DIAGNOSIS — R42 VERTIGO: ICD-10-CM

## 2023-07-07 DIAGNOSIS — N94.6 DYSMENORRHEA: ICD-10-CM

## 2023-07-07 DIAGNOSIS — R11.0 NAUSEA: ICD-10-CM

## 2023-07-07 DIAGNOSIS — J30.1 SEASONAL ALLERGIC RHINITIS DUE TO POLLEN: ICD-10-CM

## 2023-07-07 DIAGNOSIS — Z00.00 ROUTINE GENERAL MEDICAL EXAMINATION AT A HEALTH CARE FACILITY: Primary | ICD-10-CM

## 2023-07-07 PROCEDURE — 1036F TOBACCO NON-USER: CPT | Performed by: FAMILY MEDICINE

## 2023-07-07 PROCEDURE — 99395 PREV VISIT EST AGE 18-39: CPT | Performed by: FAMILY MEDICINE

## 2023-07-07 PROCEDURE — 3008F BODY MASS INDEX DOCD: CPT | Performed by: FAMILY MEDICINE

## 2023-07-07 RX ORDER — MECLIZINE HYDROCHLORIDE 50 MG/1
50 TABLET ORAL 3 TIMES DAILY PRN
Qty: 90 TABLET | Refills: 11 | Status: SHIPPED | OUTPATIENT
Start: 2023-07-07 | End: 2024-07-06

## 2023-07-07 RX ORDER — ONDANSETRON 4 MG/1
4 TABLET, ORALLY DISINTEGRATING ORAL EVERY 8 HOURS PRN
Qty: 30 TABLET | Refills: 2 | Status: SHIPPED | OUTPATIENT
Start: 2023-07-07 | End: 2023-08-06

## 2023-07-07 RX ORDER — SEMAGLUTIDE 0.25 MG/.5ML
0.25 INJECTION, SOLUTION SUBCUTANEOUS
Qty: 2 ML | Refills: 1 | Status: SHIPPED | OUTPATIENT
Start: 2023-07-07 | End: 2024-04-11 | Stop reason: ALTCHOICE

## 2023-07-07 RX ORDER — ACETAMINOPHEN 500 MG
50 TABLET ORAL DAILY
Qty: 90 CAPSULE | Refills: 3 | Status: SHIPPED | OUTPATIENT
Start: 2023-07-07 | End: 2024-07-06

## 2023-07-07 RX ORDER — IBUPROFEN 800 MG/1
800 TABLET ORAL 3 TIMES DAILY
Qty: 90 TABLET | Refills: 11 | Status: SHIPPED | OUTPATIENT
Start: 2023-07-07 | End: 2024-07-06

## 2023-07-07 RX ORDER — FLUTICASONE PROPIONATE 50 MCG
2 SPRAY, SUSPENSION (ML) NASAL DAILY
Qty: 16 G | Refills: 11 | Status: SHIPPED | OUTPATIENT
Start: 2023-07-07 | End: 2024-07-06

## 2023-07-07 RX ORDER — TRAZODONE HYDROCHLORIDE 150 MG/1
150 TABLET ORAL NIGHTLY
Qty: 90 TABLET | Refills: 3 | Status: SHIPPED | OUTPATIENT
Start: 2023-07-07 | End: 2024-07-06

## 2023-07-07 RX ORDER — CETIRIZINE HYDROCHLORIDE 10 MG/1
10 TABLET ORAL DAILY
Qty: 30 TABLET | Refills: 11 | Status: SHIPPED | OUTPATIENT
Start: 2023-07-07 | End: 2024-07-06

## 2023-07-07 ASSESSMENT — ENCOUNTER SYMPTOMS
BLOOD IN STOOL: 0
DIAPHORESIS: 0
UNEXPECTED WEIGHT CHANGE: 0
SLEEP DISTURBANCE: 0
DYSPHORIC MOOD: 0
FREQUENCY: 0
APPETITE CHANGE: 0
MYALGIAS: 0
HEADACHES: 1
RHINORRHEA: 0
WHEEZING: 0
DYSURIA: 0
DIZZINESS: 1
VOMITING: 0
JOINT SWELLING: 0
VOICE CHANGE: 0
HEMATURIA: 0
FEVER: 0
CHOKING: 0
FACIAL ASYMMETRY: 0
ABDOMINAL PAIN: 0
ABDOMINAL DISTENTION: 0
TREMORS: 0
COUGH: 0
SPEECH DIFFICULTY: 0
SEIZURES: 0
ACTIVITY CHANGE: 0
WEAKNESS: 0
FLANK PAIN: 0
TROUBLE SWALLOWING: 0
EYE PAIN: 0
LIGHT-HEADEDNESS: 0
NECK STIFFNESS: 0
AGITATION: 0
EYE REDNESS: 0
WOUND: 0
CHILLS: 0
ARTHRALGIAS: 0
VERTIGO: 1
NAUSEA: 1
EYE DISCHARGE: 0
SHORTNESS OF BREATH: 0
FATIGUE: 0
EYE ITCHING: 0
BRUISES/BLEEDS EASILY: 0
PHOTOPHOBIA: 0
PALPITATIONS: 0
NECK PAIN: 0
SORE THROAT: 0
DIARRHEA: 0
NUMBNESS: 0
CHEST TIGHTNESS: 0
HALLUCINATIONS: 0
ADENOPATHY: 0
SINUS PRESSURE: 0
CONSTIPATION: 0
BACK PAIN: 0
NERVOUS/ANXIOUS: 0
CONFUSION: 0
POLYDIPSIA: 0

## 2024-01-30 ENCOUNTER — OFFICE VISIT (OUTPATIENT)
Dept: OBSTETRICS AND GYNECOLOGY | Facility: CLINIC | Age: 39
End: 2024-01-30
Payer: COMMERCIAL

## 2024-01-30 VITALS
WEIGHT: 229 LBS | HEIGHT: 66 IN | SYSTOLIC BLOOD PRESSURE: 118 MMHG | BODY MASS INDEX: 36.8 KG/M2 | DIASTOLIC BLOOD PRESSURE: 76 MMHG

## 2024-01-30 DIAGNOSIS — N94.6 PAINFUL MENSTRUAL PERIODS: Primary | ICD-10-CM

## 2024-01-30 DIAGNOSIS — Z12.31 VISIT FOR SCREENING MAMMOGRAM: ICD-10-CM

## 2024-01-30 PROCEDURE — 99203 OFFICE O/P NEW LOW 30 MIN: CPT | Performed by: ADVANCED PRACTICE MIDWIFE

## 2024-01-30 PROCEDURE — 3008F BODY MASS INDEX DOCD: CPT | Performed by: ADVANCED PRACTICE MIDWIFE

## 2024-01-30 PROCEDURE — 1036F TOBACCO NON-USER: CPT | Performed by: ADVANCED PRACTICE MIDWIFE

## 2024-01-30 RX ORDER — NORETHINDRONE 0.35 MG/1
1 TABLET ORAL DAILY
Qty: 28 TABLET | Refills: 11 | Status: SHIPPED | OUTPATIENT
Start: 2024-01-30 | End: 2025-01-29

## 2024-01-30 NOTE — PROGRESS NOTES
"GYNECOLOGY PROGRESS NOTE          CC:   see below. Patient states she has been on OCPS since 15 years of age due to menstrual issues. Patient has been on progestin only pills since around 34 due to age and smoking. No other medical issues she per patient. Denies Hx of fibroids. States when she is on OCPS her menses are light and her symptoms improve. D/O other progestin only methods. D/O Mirena. D/O if bleeding is not helped with progestin only pills then consider sonogram and or hysteroscopic exam due to heavy menses.  Chief Complaint   Patient presents with    Dysmenorrhea     Patient presents for period pain. Patient states she gets horrible cramps, leg pain, mood swings, nausea, stomach upset, loss of appetite. Patient states she starts to get headaches a couple days prior to cycles, patient states then all the other symptoms happen with cycles. Patient states when she is on birth control she feels like the symptoms are less.         HPI:  Ruma Barrios is here with multiple complaints related to menses.      ROS:  GYN - see HPI        PHYSICAL EXAM:  /76 (BP Location: Left arm, Patient Position: Sitting, BP Cuff Size: Large adult)   Ht 1.676 m (5' 6\")   Wt 104 kg (229 lb)   LMP 01/18/2024   BMI 36.96 kg/m²   GEN:  A&O, NAD  HEENT:  head HC/AT, no visible goiter  PSYCH:  normal affect, non-anxious      IMPRESSION/PLAN:    A: Heavy menses with nausea, mood changes, and pain. Symptoms better with progestin only use.  Plan: 1. Pap due at end of 2024. 2. Order for mammogram given to be done after her 39th birthday. 3. Refill progestin only pills. 4. Will call if she would like Mirena or if she wants sonogram due to heavy menses.    Problem List Items Addressed This Visit    None  Visit Diagnoses       Painful menstrual periods                       Mattie Santiago, HERMELINDA-LUCA        "

## 2024-02-06 ENCOUNTER — OFFICE VISIT (OUTPATIENT)
Dept: PRIMARY CARE | Facility: CLINIC | Age: 39
End: 2024-02-06
Payer: COMMERCIAL

## 2024-02-06 VITALS
RESPIRATION RATE: 16 BRPM | OXYGEN SATURATION: 98 % | DIASTOLIC BLOOD PRESSURE: 76 MMHG | HEIGHT: 66 IN | WEIGHT: 232.6 LBS | HEART RATE: 85 BPM | TEMPERATURE: 98 F | BODY MASS INDEX: 37.38 KG/M2 | SYSTOLIC BLOOD PRESSURE: 110 MMHG

## 2024-02-06 DIAGNOSIS — R05.9 COUGH IN ADULT PATIENT: ICD-10-CM

## 2024-02-06 DIAGNOSIS — R09.81 NASAL CONGESTION WITH RHINORRHEA: ICD-10-CM

## 2024-02-06 DIAGNOSIS — J34.89 NASAL CONGESTION WITH RHINORRHEA: ICD-10-CM

## 2024-02-06 DIAGNOSIS — J00 NASOPHARYNGITIS: Primary | ICD-10-CM

## 2024-02-06 PROCEDURE — 99213 OFFICE O/P EST LOW 20 MIN: CPT | Performed by: NURSE PRACTITIONER

## 2024-02-06 RX ORDER — BENZONATATE 200 MG/1
200 CAPSULE ORAL 3 TIMES DAILY PRN
Qty: 20 CAPSULE | Refills: 0 | Status: SHIPPED | OUTPATIENT
Start: 2024-02-06 | End: 2024-02-13

## 2024-02-06 RX ORDER — AMOXICILLIN 875 MG/1
875 TABLET, FILM COATED ORAL 2 TIMES DAILY
Qty: 14 TABLET | Refills: 0 | Status: SHIPPED | OUTPATIENT
Start: 2024-02-06 | End: 2024-02-13

## 2024-02-06 ASSESSMENT — PATIENT HEALTH QUESTIONNAIRE - PHQ9
1. LITTLE INTEREST OR PLEASURE IN DOING THINGS: NOT AT ALL
2. FEELING DOWN, DEPRESSED OR HOPELESS: NOT AT ALL
SUM OF ALL RESPONSES TO PHQ9 QUESTIONS 1 AND 2: 0
2. FEELING DOWN, DEPRESSED OR HOPELESS: NOT AT ALL
1. LITTLE INTEREST OR PLEASURE IN DOING THINGS: NOT AT ALL
SUM OF ALL RESPONSES TO PHQ9 QUESTIONS 1 AND 2: 0

## 2024-02-06 ASSESSMENT — ENCOUNTER SYMPTOMS
OCCASIONAL FEELINGS OF UNSTEADINESS: 0
DEPRESSION: 0
LOSS OF SENSATION IN FEET: 0

## 2024-02-06 NOTE — PROGRESS NOTES
Subjective   Patient ID: Ruma Barrios is a 38 y.o. female who is with a chief complaint of symptoms of respiratory tract infection.     HPI  Patient is a 38 y.o. female who CONSULTED AT [facility] today. Patient is with complaints of nasal congestion, mild nasal discharge, headache / sinus pain, dry cough, fatigue, muscle aches, intermittent diarrhea, and chills. She denies having sore throat, loss of sense of taste, loss of sense of smell, nor fever. Patient states that present condition started about 5 days ago. Patient denies history of recent travel, exposure to person/people who tested positive for COVID 19, nor exposure to person/people with flu like symptoms. she denies shortness of breath, chest pain, palpitations, nor edema. she stated that she  tried OTC medications which afforded only slight relief of symptoms. she denies nausea, vomiting, abdominal pain, nor any other symptoms.    Patient states she had her COVID vaccine.  Patient states she have not yet received flu shot for this season.    Review of Systems  General: no weight loss, generally healthy, (+) fatigue  Head: (+) headaches / sinus pain, no vertigo, no injury  Eyes: no diplopia, no tearing, no pain,   Ears: no change in hearing, no tinnitus, no bleeding, no vertigo  Mouth:  no dental difficulties, no gingival bleeding, no sore throat, no loss of sense of taste  Nose: (+) congestion, (+) mild discharge, no bleeding, no obstruction, no loss of sense of smell  Neck: no stiffness, no pain, no tenderness, no masses, no bruit  Pulmonary: no dyspnea, no wheezing, no hemoptysis, no cough  Cardiovascular: no chest pain, no palpitations, no syncope, no orthopnea  Gastrointestinal: no change in appetite, no dysphagia, no abdominal pains, (+) intermittent diarrhea, no emesis, no melena  Genito Urinary: no dysuria, no urinary urgency, no nocturia, no incontinence, no change in nature of urine  Musculoskeletal: (+) muscle ache,  no joint pain, no limitation of range of motion, no paresthesia, no numbness  Constitutional: no fever, (+) chills, no night sweats    Objective   Physical Exam  General: ambulatory, in no acute distress  Head: normocephalic, no lesions, no sinus tenderness  Eyes: pink palpebral conjunctiva, anicteric sclerae, PERRLA, EOM's full  Ears: clear external auditory canals, no ear discharge, no bleeding from the ears, tympanic membrane intact  Nose: (+) congested nasal mucosa, (+) yellow mucoid nasal discharge, no bleeding, no obstruction  Throat: (+) erythema, and (+) exudate on posterior pharyngeal wall, no lesion  Neck: supple, no masses, no bruits, no CLADP  Chest: symmetrical chest expansion, no lagging, no retractions, clear breath sounds, no rales, no wheezes    Assessment/Plan   Problem List Items Addressed This Visit    None  Visit Diagnoses         Codes    Nasopharyngitis    -  Primary J00    Relevant Medications    amoxicillin (Amoxil) 875 mg tablet    Cough in adult patient     R05.9    Relevant Medications    amoxicillin (Amoxil) 875 mg tablet    benzonatate (Tessalon) 200 mg capsule    Nasal congestion with rhinorrhea     R09.81, J34.89    Relevant Medications    amoxicillin (Amoxil) 875 mg tablet        DISCHARGE SUMMARY:   Patient was seen and examined. Diagnosis, treatment, treatment options, and possible complications of today's illness discussed and explained to patient. Patient to take medication/s associated with this visit. Patient may also take OTC analgesic/antipyretic if needed for pain/fever. Advised to increase oral fluid intake. Advised steam inhalation if needed to relieve congestion. Advised warm saline gargle if needed to relieve throat discomfort. Advised Listerine antiseptic mouthwash gargle TID. Patient was advised to discard the old toothbrush and use a new toothbrush beginning on the third of antibiotics. Advised to come back if with worsening or persistent symptoms. Patient verbalized  understanding of plan of care.    Patient to come back in 7 - 10 days if needed for worsening symptoms.           HERMELINDA Zhou-CNP 02/06/24 2:30 PM

## 2024-02-06 NOTE — PROGRESS NOTES
"Subjective   Patient ID: Ruma Barrios is a 38 y.o. female who presents for URI (Pt is in office with URI Sx  cough, chest pain when cough, swollen glands, ear pain, diarrhea sneezing headaches runny nose congestion./Pt took ibuprofen and tylenol with mild help. And mucinex for the congestion with mild help.).    HPI     Review of Systems    Objective   /76   Pulse 85   Temp 36.7 °C (98 °F) (Temporal)   Resp 16   Ht 1.676 m (5' 6\")   Wt 106 kg (232 lb 9.6 oz)   LMP 01/18/2024   SpO2 98%   BMI 37.54 kg/m²     Physical Exam    Assessment/Plan          "

## 2024-02-06 NOTE — PATIENT INSTRUCTIONS
DISCHARGE SUMMARY:   Patient was seen and examined. Diagnosis, treatment, treatment options, and possible complications of today's illness discussed and explained to patient. Patient to take medication/s associated with this visit. Patient may also take OTC analgesic/antipyretic if needed for pain/fever. Advised to increase oral fluid intake. Advised steam inhalation if needed to relieve congestion. Advised warm saline gargle if needed to relieve throat discomfort. Advised Listerine antiseptic mouthwash gargle TID. Patient was advised to discard the old toothbrush and use a new toothbrush beginning on the third of antibiotics. Advised to come back if with worsening or persistent symptoms. Patient verbalized understanding of plan of care.    Patient to come back in 7 - 10 days if needed for worsening symptoms.

## 2024-02-09 ENCOUNTER — DOCUMENTATION (OUTPATIENT)
Dept: PRIMARY CARE | Facility: CLINIC | Age: 39
End: 2024-02-09
Payer: COMMERCIAL

## 2024-02-09 ENCOUNTER — TELEPHONE (OUTPATIENT)
Dept: PRIMARY CARE | Facility: CLINIC | Age: 39
End: 2024-02-09

## 2024-02-09 DIAGNOSIS — R05.9 COUGH IN ADULT PATIENT: ICD-10-CM

## 2024-02-09 DIAGNOSIS — J00 NASOPHARYNGITIS: Primary | ICD-10-CM

## 2024-02-09 DIAGNOSIS — J34.89 NASAL CONGESTION WITH RHINORRHEA: ICD-10-CM

## 2024-02-09 DIAGNOSIS — R09.81 NASAL CONGESTION WITH RHINORRHEA: ICD-10-CM

## 2024-02-09 RX ORDER — CEFDINIR 300 MG/1
300 CAPSULE ORAL 2 TIMES DAILY
Qty: 20 CAPSULE | Refills: 0 | Status: SHIPPED | OUTPATIENT
Start: 2024-02-09 | End: 2024-02-19

## 2024-02-09 NOTE — PROGRESS NOTES
PATIENT NEEDS CALL BACK PER FRONT OFFICE: I called and talked to patient. We discussed symptoms progress. She felt worse with the nasal congestion, and discharge. She also has ear pain and sore throat. I will prescribe cefdinir.

## 2024-02-09 NOTE — TELEPHONE ENCOUNTER
Pt was seen on 2/6/2024 for URI with   Je. Was prescribed amoxicillin and tessalon pearls. Pt states she is getting worse.     Please advise

## 2024-03-28 PROBLEM — Z20.822 SUSPECTED SEVERE ACUTE RESPIRATORY SYNDROME CORONAVIRUS 2 (SARS-COV-2) INFECTION: Status: RESOLVED | Noted: 2024-03-28 | Resolved: 2024-03-28

## 2024-03-28 PROBLEM — R10.84 GENERALIZED ABDOMINAL PAIN: Status: ACTIVE | Noted: 2017-07-23

## 2024-04-10 ASSESSMENT — PROMIS GLOBAL HEALTH SCALE
RATE_MENTAL_HEALTH: GOOD
EMOTIONAL_PROBLEMS: RARELY
RATE_AVERAGE_FATIGUE: MILD
RATE_AVERAGE_PAIN: 0
RATE_GENERAL_HEALTH: GOOD
CARRYOUT_PHYSICAL_ACTIVITIES: COMPLETELY
CARRYOUT_SOCIAL_ACTIVITIES: GOOD
RATE_QUALITY_OF_LIFE: GOOD
RATE_SOCIAL_SATISFACTION: GOOD
RATE_PHYSICAL_HEALTH: FAIR

## 2024-04-11 ENCOUNTER — OFFICE VISIT (OUTPATIENT)
Dept: PRIMARY CARE | Facility: CLINIC | Age: 39
End: 2024-04-11
Payer: COMMERCIAL

## 2024-04-11 VITALS
OXYGEN SATURATION: 95 % | RESPIRATION RATE: 18 BRPM | HEART RATE: 76 BPM | TEMPERATURE: 97.7 F | WEIGHT: 231 LBS | BODY MASS INDEX: 37.12 KG/M2 | HEIGHT: 66 IN | DIASTOLIC BLOOD PRESSURE: 80 MMHG | SYSTOLIC BLOOD PRESSURE: 115 MMHG

## 2024-04-11 DIAGNOSIS — R42 VERTIGO: ICD-10-CM

## 2024-04-11 DIAGNOSIS — Z00.00 ROUTINE GENERAL MEDICAL EXAMINATION AT A HEALTH CARE FACILITY: Primary | ICD-10-CM

## 2024-04-11 DIAGNOSIS — E66.09 CLASS 2 OBESITY DUE TO EXCESS CALORIES WITHOUT SERIOUS COMORBIDITY WITH BODY MASS INDEX (BMI) OF 37.0 TO 37.9 IN ADULT: ICD-10-CM

## 2024-04-11 DIAGNOSIS — Z11.59 ENCOUNTER FOR HEPATITIS C SCREENING TEST FOR LOW RISK PATIENT: ICD-10-CM

## 2024-04-11 DIAGNOSIS — Z11.4 SCREENING FOR HUMAN IMMUNODEFICIENCY VIRUS WITHOUT PRESENCE OF RISK FACTORS: ICD-10-CM

## 2024-04-11 PROCEDURE — 99396 PREV VISIT EST AGE 40-64: CPT | Performed by: FAMILY MEDICINE

## 2024-04-11 PROCEDURE — 3008F BODY MASS INDEX DOCD: CPT | Performed by: FAMILY MEDICINE

## 2024-04-11 PROCEDURE — 1036F TOBACCO NON-USER: CPT | Performed by: FAMILY MEDICINE

## 2024-04-11 ASSESSMENT — ENCOUNTER SYMPTOMS
CONFUSION: 0
BLOOD IN STOOL: 0
FATIGUE: 0
DIARRHEA: 0
POLYDIPSIA: 0
VISUAL CHANGE: 0
WOUND: 0
RHINORRHEA: 0
COUGH: 0
WEAKNESS: 0
VERTIGO: 1
JOINT SWELLING: 0
WHEEZING: 0
UNEXPECTED WEIGHT CHANGE: 0
NECK PAIN: 0
TROUBLE SWALLOWING: 0
ABDOMINAL PAIN: 0
HALLUCINATIONS: 0
BRUISES/BLEEDS EASILY: 0
SORE THROAT: 0
ADENOPATHY: 0
EYE PAIN: 0
ABDOMINAL DISTENTION: 0
FLANK PAIN: 0
EYE REDNESS: 0
EYE DISCHARGE: 0
TREMORS: 0
CHOKING: 0
PALPITATIONS: 0
AGITATION: 0
FACIAL ASYMMETRY: 0
FREQUENCY: 0
HEADACHES: 1
MYALGIAS: 0
DIZZINESS: 1
DIAPHORESIS: 0
VOICE CHANGE: 0
HEMATURIA: 0
ACTIVITY CHANGE: 0
DYSPHORIC MOOD: 0
SEIZURES: 0
SHORTNESS OF BREATH: 0
DYSURIA: 0
NECK STIFFNESS: 0
EYE ITCHING: 0
SPEECH DIFFICULTY: 0
FEVER: 0
CHEST TIGHTNESS: 0
PHOTOPHOBIA: 0
SLEEP DISTURBANCE: 0
NERVOUS/ANXIOUS: 0
BACK PAIN: 0
CHILLS: 0
SINUS PRESSURE: 0
CONSTIPATION: 0
LIGHT-HEADEDNESS: 0
ARTHRALGIAS: 0
VOMITING: 0
NAUSEA: 1
APPETITE CHANGE: 0
NUMBNESS: 0

## 2024-04-11 NOTE — PROGRESS NOTES
"Subjective   Patient ID: Ruma Barrios is a 39 y.o. female who presents for Obesity (Pt. Would like to discuss weight loss options. She never got Wegovy because it was out of stock ).    HPI     Review of Systems    Objective   /80 (BP Location: Left arm, Patient Position: Sitting, BP Cuff Size: Large adult)   Pulse 76   Temp 36.5 °C (97.7 °F) (Temporal)   Resp 18   Ht 1.676 m (5' 6\")   Wt 105 kg (231 lb)   SpO2 95%   BMI 37.28 kg/m²     Physical Exam    Assessment/Plan   {Assess/PlanSmartLinks:43018}       "

## 2024-04-11 NOTE — PROGRESS NOTES
"Subjective   Patient ID: Ruma Barrios is a 39 y.o. female who presents for Obesity (Pt. Would like to discuss weight loss options. She never got Wegovy because it was out of stock ).    Patient is here for a yearly physical with chief complaint of vertigo which she reports occurs 4-5 times a month, usually preceded by changes in position such as bending down to pick something up or turning head too fast. She has tried meclizine 50 mg, ibuprofen, and \"Youtube exercises\" with minimal relief of symptoms. When first diagnosed with vertigo, patient had episodes of vomiting, now quite rare, which are responsive to Zofran.    Additional concern today is weight loss. Patient was prescribed Wegovy at last visit but had difficulty finding a pharmacy who stocks medication. In the interim, patient has attempted fasting and exercise with minimal effect.    Patient notes allergies are well controled with Zyrtec and Flonase.    Well Adult Physical   Patient here for a comprehensive physical exam. The patient reports problems - vertigo, elevated weight     Do you take any herbs or supplements that were not prescribed by a doctor? no   Are you taking calcium supplements? no   Are you taking aspirin daily? no      History:  LMP: 2024  Last pap date: , saw gynecologist in 2024 was advised no need for pap at that time  Abnormal pap? no  : 2  Para: 0    Patient advised that next colonoscopy is due at age 44.             Dizziness  This is a chronic problem. The current episode started more than 1 year ago. The problem occurs intermittently. The problem has been unchanged. Associated symptoms include headaches, nausea and vertigo. Pertinent negatives include no abdominal pain, arthralgias, chest pain, chills, congestion, coughing, diaphoresis, fatigue, fever, joint swelling, myalgias, neck pain, numbness, rash, sore throat, visual change, vomiting or weakness. The symptoms are aggravated by bending (head " "turning to left). She has tried acetaminophen, NSAIDs and position changes (meclizine) for the symptoms. The treatment provided mild relief.        Review of Systems   Constitutional:  Negative for activity change, appetite change, chills, diaphoresis, fatigue, fever and unexpected weight change.   HENT:  Negative for congestion, ear pain, hearing loss, nosebleeds, postnasal drip, rhinorrhea, sinus pressure, sneezing, sore throat, tinnitus, trouble swallowing and voice change.    Eyes:  Negative for photophobia, pain, discharge, redness, itching and visual disturbance.   Respiratory:  Negative for cough, choking, chest tightness, shortness of breath and wheezing.    Cardiovascular:  Negative for chest pain, palpitations and leg swelling.   Gastrointestinal:  Positive for nausea. Negative for abdominal distention, abdominal pain, blood in stool, constipation, diarrhea and vomiting.   Endocrine: Negative for cold intolerance, heat intolerance, polydipsia and polyuria.   Genitourinary:  Negative for dysuria, flank pain, frequency, hematuria and urgency.   Musculoskeletal:  Negative for arthralgias, back pain, joint swelling, myalgias, neck pain and neck stiffness.   Skin:  Negative for rash and wound.   Allergic/Immunologic: Negative for immunocompromised state.   Neurological:  Positive for dizziness, vertigo and headaches. Negative for tremors, seizures, syncope, facial asymmetry, speech difficulty, weakness, light-headedness and numbness.   Hematological:  Negative for adenopathy. Does not bruise/bleed easily.   Psychiatric/Behavioral:  Negative for agitation, behavioral problems, confusion, dysphoric mood, hallucinations, self-injury, sleep disturbance and suicidal ideas. The patient is not nervous/anxious.      Objective   /80 (BP Location: Left arm, Patient Position: Sitting, BP Cuff Size: Large adult)   Pulse 76   Temp 36.5 °C (97.7 °F) (Temporal)   Resp 18   Ht 1.676 m (5' 6\")   Wt 105 kg (231 lb)   " SpO2 95%   BMI 37.28 kg/m²     Physical Exam  Constitutional:       General: She is not in acute distress.     Appearance: Normal appearance. She is obese. She is not ill-appearing or diaphoretic.   HENT:      Head: Normocephalic and atraumatic.      Right Ear: External ear normal.      Left Ear: External ear normal.      Nose: Nose normal. No rhinorrhea.      Mouth/Throat:      Mouth: Mucous membranes are moist.   Eyes:      General: Lids are normal. No scleral icterus.        Right eye: No discharge.         Left eye: No discharge.      Conjunctiva/sclera: Conjunctivae normal.   Cardiovascular:      Rate and Rhythm: Normal rate and regular rhythm.      Pulses: Normal pulses.      Heart sounds: No murmur heard.  Pulmonary:      Effort: Pulmonary effort is normal. No respiratory distress.      Breath sounds: No decreased breath sounds, wheezing, rhonchi or rales.   Abdominal:      General: Bowel sounds are normal. There is no distension.      Palpations: Abdomen is soft. There is no mass.      Tenderness: There is no abdominal tenderness. There is no guarding or rebound.   Musculoskeletal:         General: No swelling, tenderness or deformity.      Cervical back: Normal range of motion. No rigidity or tenderness.      Right lower leg: No edema.      Left lower leg: No edema.   Lymphadenopathy:      Cervical: No cervical adenopathy.      Upper Body:      Right upper body: No supraclavicular adenopathy.      Left upper body: No supraclavicular adenopathy.   Skin:     General: Skin is warm and dry.      Coloration: Skin is not jaundiced or pale.      Findings: No erythema, lesion or rash.   Neurological:      General: No focal deficit present.      Mental Status: She is alert and oriented to person, place, and time.      Sensory: No sensory deficit.      Motor: No weakness or tremor.      Coordination: Coordination normal.      Gait: Gait normal.   Psychiatric:         Mood and Affect: Mood normal. Affect is not  inappropriate.         Behavior: Behavior normal.         Thought Content: Thought content normal.       Assessment/Plan   Diagnoses and all orders for this visit:  Routine general medical examination at a health care facility  -     Follow Up In [facility] - PCP - Health Maintenance; Future  Encounter for hepatitis C screening test for low risk patient  -     Hepatitis C antibody; Future  Screening for human immunodeficiency virus without presence of risk factors  -     HIV 1/2 Antigen/Antibody Screen with Reflex to Confirmation; Future  Vertigo  -     Referral to Physical Therapy; Future  Class 2 obesity due to excess calories without serious comorbidity with body mass index (BMI) of 37.0 to 37.9 in adult  -     semaglutide, weight loss, (WEGOVY) 0.25 mg/0.5 mL pen injector; Inject 0.25 mg under the skin every 7 days.  -     CBC and Auto Differential; Future  -     Comprehensive Metabolic Panel; Future  -     Lipid Panel; Future  -     Magnesium; Future  -     TSH with reflex to Free T4 if abnormal; Future  -     Vitamin D 25-Hydroxy,Total (for eval of Vitamin D levels); Future    Patient reports vertigo has been refractory to antihistamine. Referral placed for vestibular rehab. Will hold off on trialing benzodiazepine for now.    Regarding obesity, patient's semaglutide will be filled by compounding pharmacy [facility] for a month supply at starting dose. If well tolerated, will increase to therapeutic dose and fill a year's supply.    Patient voiced understanding and agreement with the treatment plan. Will plan for labs after this visit. Will additionally follow up with patient in a year with repeat labs.       Patient was seen and evaluated with student. I was present for critical portion of history and physical exam. I reviewed note with student and agree with findings as written  JOEY DO  2. Patient Counseling:  --Nutrition: Stressed importance of moderation in sodium/caffeine intake, saturated fat and  cholesterol, caloric balance, sufficient intake of fresh fruits, vegetables, fiber, calcium, iron.  --Exercise: Stressed the importance of regular exercise.   --Substance Abuse: Discussed cessation/primary prevention of tobacco, alcohol, or other drug use; driving or other dangerous activities under the influence; availability of treatment for abuse.   --Injury prevention: Discussed safety belts, safety helmets, smoke detector, smoking near bedding or upholstery.   --Dental health: Discussed importance of regular tooth brushing, flossing, and dental visits.  --Immunizations reviewed.  --Discussed benefits of screening colonoscopy.  3. Discussed the patient's BMI with her.  The BMI is above average. The patient received Current weight: 105 kg (231 lb)  Weight change since last visit (-) denotes wt loss -1.6 lbs   Weight loss needed to achieve BMI 25: 76.4 Lbs  Weight loss needed to achieve BMI 30: 45.5 Lbs    Provided instructions on dietary changes  Provided instructions on exercise  Advised to Increase physical activity because they have an above normal BMI.  4. Follow up 1 yr wlabs

## 2024-04-18 ENCOUNTER — APPOINTMENT (OUTPATIENT)
Dept: RADIOLOGY | Facility: CLINIC | Age: 39
End: 2024-04-18
Payer: COMMERCIAL

## 2024-05-06 ENCOUNTER — TELEPHONE (OUTPATIENT)
Dept: PRIMARY CARE | Facility: CLINIC | Age: 39
End: 2024-05-06
Payer: COMMERCIAL

## 2024-05-06 DIAGNOSIS — E66.09 CLASS 2 OBESITY DUE TO EXCESS CALORIES WITHOUT SERIOUS COMORBIDITY WITH BODY MASS INDEX (BMI) OF 37.0 TO 37.9 IN ADULT: ICD-10-CM

## 2024-06-03 ENCOUNTER — EVALUATION (OUTPATIENT)
Dept: PHYSICAL THERAPY | Facility: CLINIC | Age: 39
End: 2024-06-03
Payer: COMMERCIAL

## 2024-06-03 DIAGNOSIS — M54.2 NECK PAIN: Primary | ICD-10-CM

## 2024-06-03 DIAGNOSIS — R42 VERTIGO: ICD-10-CM

## 2024-06-03 PROCEDURE — 97162 PT EVAL MOD COMPLEX 30 MIN: CPT | Mod: GP | Performed by: GENERAL ACUTE CARE HOSPITAL

## 2024-06-03 PROCEDURE — 97110 THERAPEUTIC EXERCISES: CPT | Mod: GP | Performed by: GENERAL ACUTE CARE HOSPITAL

## 2024-06-03 NOTE — PROGRESS NOTES
Patient Name: Ruma Barrios  MRN: [id number]  Today's Date: 6/3/2024     Current Problem:  1. Vertigo  Referral to Physical Therapy              Visit 1/10    Cause of symptoms: sinus infection > 10 years ago, on/off the last   Severity of Symptoms: 3-4/10   Previous treatment/effect: meds- sometimes help   PMH related to problems:   Psychological (depression/anxiety): yes  Falls/ near falls: no  Functional limitations: none (driving at times)   Occupation/Hobbies: computer   Sleep: trazadone for sleep   Red Flags: stress  Cranial Nerves: WFL   Spontaneous Nystagmus:   Gaze Holding Nystagmus: -  Smooth Pursuit: -negative  Saccadic Eye Movement: -  VOR (slow): -  VOR (Head Thrust): -    Convergence: 10 cm  Pupillary reflex: WNL     SLS eyes open (R/L):  10/10  SLS eyes closed (R/L): 10/10  Romber+  Tandem Romber+    Standish Hallpike: dizziness left negative for nystagmus   Cervical Spine: NT (grossly WFL)     Evaluation, Tests and measures, Education including HEP instruction and feedback. Patient appears to be compliant and motivated to perform HEP as instructed and participate in active physical therapy as indicated.The patient was educated on: the importance of positioning, proper posture, and body mechanics signs and symptoms of Vertigo BPPV peripheral vs central origin. .  Patient's questions were answered to their satisfaction, & patient verbalized understanding & agreement with POC.  The patient presents to Physical Therapy with signs and symptoms consistent with the medical diagnosis of vertigo. Key impairments include:  dizziness and difficulty with functional activities such as driving and turning head. Skilled PT is required to address these key impairments and to provide and progress with an appropriate home exercise program. This evaluation is of  mod complexity due to the nature of the patient's presentation as well as the comorbidities and medical factors included in this evaluation.  Treatment  may include: Therapeutic Exercise (PROM, AA/AROM, Flexibility, Strengthening, Stabilization, HEP instruction). Therapeutic Activities (Transfers, Body Mechanics, Work Related Activities, Closed Chain, Agility and Power).   Manual Techniques (Soft tissue Mobilization, Joint mobilization/Distraction, Muscle Energy Techniques, Lymphatic Drainage, Dry Needling).  Neuromuscular Reeducation (Postural Training, Balance/Proprioception, Relaxation Techniques). Biofeedback. Aquatic Exercise. Modalities: Ultrasound, Moist Heat, Cryotherapy, Vasopneumatic with or without Cryotherapy. Electrical Stimulation (TENS/IFC/ Pre modulated for pain relief, NMES for Muscle reeducation). Gait Training. Orthotic Fit and Training. Strapping, Kinesio taping.     Patient will demonstrate improved posture without cueing through therapy session indicated by neutral cervical tilt, neutral shoulder position, and decreased cervical lordosis    Patient will correctly perform home exercise program independently, demonstrated through patient teach back upon discharge.

## 2024-06-17 ENCOUNTER — APPOINTMENT (OUTPATIENT)
Dept: PHYSICAL THERAPY | Facility: CLINIC | Age: 39
End: 2024-06-17
Payer: COMMERCIAL

## 2024-06-18 ENCOUNTER — TELEPHONE (OUTPATIENT)
Dept: PRIMARY CARE | Facility: CLINIC | Age: 39
End: 2024-06-18
Payer: COMMERCIAL

## 2024-06-20 ENCOUNTER — APPOINTMENT (OUTPATIENT)
Dept: RADIOLOGY | Facility: CLINIC | Age: 39
End: 2024-06-20
Payer: COMMERCIAL

## 2024-06-20 ENCOUNTER — APPOINTMENT (OUTPATIENT)
Dept: RADIOLOGY | Facility: HOSPITAL | Age: 39
End: 2024-06-20
Payer: COMMERCIAL

## 2024-06-21 ENCOUNTER — HOSPITAL ENCOUNTER (OUTPATIENT)
Dept: RADIOLOGY | Facility: HOSPITAL | Age: 39
Discharge: HOME | End: 2024-06-21
Payer: COMMERCIAL

## 2024-06-21 VITALS — BODY MASS INDEX: 37.12 KG/M2 | HEIGHT: 66 IN | WEIGHT: 231 LBS

## 2024-06-21 DIAGNOSIS — Z12.31 VISIT FOR SCREENING MAMMOGRAM: ICD-10-CM

## 2024-06-21 PROCEDURE — 77067 SCR MAMMO BI INCL CAD: CPT

## 2024-07-01 ENCOUNTER — APPOINTMENT (OUTPATIENT)
Dept: PHYSICAL THERAPY | Facility: CLINIC | Age: 39
End: 2024-07-01
Payer: COMMERCIAL

## 2024-07-15 ENCOUNTER — APPOINTMENT (OUTPATIENT)
Dept: PHYSICAL THERAPY | Facility: CLINIC | Age: 39
End: 2024-07-15
Payer: COMMERCIAL

## 2024-07-15 DIAGNOSIS — F51.01 PRIMARY INSOMNIA: ICD-10-CM

## 2024-07-15 RX ORDER — TRAZODONE HYDROCHLORIDE 150 MG/1
150 TABLET ORAL NIGHTLY
Qty: 90 TABLET | Refills: 2 | Status: SHIPPED | OUTPATIENT
Start: 2024-07-15

## 2024-07-15 NOTE — PROGRESS NOTES
"Physical Therapy Treatment      Patient Name: Ruma Barrios  MRN: [id number]  Today's Date: 7/15/2024  Visit #: 2  Insurance: 2024 BENEFITS // 20% coins, $3000 / $6000 ded ($0 met), no copay, 40v lazarus yr PT/OT, no PA        No diagnosis found.    ASSESSMENT:  ***    GOALS:  ***    PLAN:  ***    SUBJECTIVE:    Patient arrives as a transfer of care from colleague at other clinic.    (note:  \"y\" = yes; \"n\" = no)    Hx of:  - Anxiety ***  - Headaches / migraines (photo/phonophobia) ***  - Concussion ***  - CVA ***  - Neck pain ***  - TMD ***  - Motion sensitivity (car, boat) ***  - Sinus infections ***  - Ear infections ***  - Heart conditions (afib, HTN, OH) ***  - DM ***  - Autoimmune disorders **  - Thyroid disorders ***  - Alcohol use ***  - Caffeine use ***    Hearing:  - Loss (sudden or gradual) ***  - Tinnitus ***  - Audiogram ***  - Autophony ***    Eye Conditions: ***    Vestibular Tests:   - VNG ***  - VEMP ***    Imaging:  - CT ***  - MRI ***      OBJECTIVE:  Vestibular: (“g” = goggles)  Occulomotor -  - ROM: ***  - Smooth Pursuit:  ***  - Horizontal Saccades:  ***  - Vertical Saccades:  ***  - Eye Alignment:  ***  - Static Visual Acuity: ***  - Cover:  ***  - Uncover:  ***  - Cross Cover:  ***  - Skew Deviation:  ***  - Convergence:  ***    Vestibular-Specific:  - Spontaneous Nystagmus: ***  - Gaze Evoked Nystagmus:  ***  - Horizontal VOR: ***  - Vertical VOR:  ***  - R Head Thrust:  ***  - L Head Thrust:  ***  - DVA:  ***  - Head Shake Nystagmus (Horizontal): ***  - Head Shake Nystagmus (Vertical):  ***    Positional Testing:  - R DHP: ***  - R HRT:  ***  - R Sidelying Test:  ***  - L DHP: ***  - L HRT:  ***  - L Sidelying Test: ***      TREATMENT:  Neuromuscular Re-education (92679): *** minutes    Therapeutic Activity (87328): *** minutes    Therapeutic Exercise (62682): *** minutes    Manual Therapy (05142): *** minutes    Gait Training (88697): *** minutes     "

## 2024-07-24 DIAGNOSIS — E66.09 CLASS 2 OBESITY DUE TO EXCESS CALORIES WITHOUT SERIOUS COMORBIDITY WITH BODY MASS INDEX (BMI) OF 37.0 TO 37.9 IN ADULT: ICD-10-CM

## 2024-07-24 NOTE — TELEPHONE ENCOUNTER
Patient phones the office today w/ request to have a refill of her Semaglutide (Wegovy) with the increased dose sent to [facility] in [place] please.     Thank you.

## 2024-07-25 RX ORDER — SEMAGLUTIDE 1 MG/.5ML
1 INJECTION, SOLUTION SUBCUTANEOUS
Qty: 2 ML | Refills: 0 | Status: SHIPPED | OUTPATIENT
Start: 2024-07-28 | End: 2024-08-19

## 2024-09-30 ENCOUNTER — TELEPHONE (OUTPATIENT)
Dept: PRIMARY CARE | Facility: CLINIC | Age: 39
End: 2024-09-30
Payer: COMMERCIAL

## 2024-09-30 NOTE — TELEPHONE ENCOUNTER
Pt states that she would like to go to the next dose of semaglutide through [facility], please advise

## 2024-10-01 DIAGNOSIS — E66.812 CLASS 2 OBESITY DUE TO EXCESS CALORIES WITHOUT SERIOUS COMORBIDITY WITH BODY MASS INDEX (BMI) OF 37.0 TO 37.9 IN ADULT: ICD-10-CM

## 2024-10-01 DIAGNOSIS — E66.09 CLASS 2 OBESITY DUE TO EXCESS CALORIES WITHOUT SERIOUS COMORBIDITY WITH BODY MASS INDEX (BMI) OF 37.0 TO 37.9 IN ADULT: ICD-10-CM

## 2024-10-01 RX ORDER — SEMAGLUTIDE 1 MG/.5ML
1 INJECTION, SOLUTION SUBCUTANEOUS
Qty: 2 ML | Refills: 5 | Status: SHIPPED | OUTPATIENT
Start: 2024-10-06

## 2024-11-05 DIAGNOSIS — E66.812 CLASS 2 OBESITY DUE TO EXCESS CALORIES WITHOUT SERIOUS COMORBIDITY WITH BODY MASS INDEX (BMI) OF 37.0 TO 37.9 IN ADULT: ICD-10-CM

## 2024-11-05 DIAGNOSIS — E66.09 CLASS 2 OBESITY DUE TO EXCESS CALORIES WITHOUT SERIOUS COMORBIDITY WITH BODY MASS INDEX (BMI) OF 37.0 TO 37.9 IN ADULT: ICD-10-CM

## 2024-11-05 RX ORDER — SEMAGLUTIDE 1 MG/.5ML
1 INJECTION, SOLUTION SUBCUTANEOUS
Qty: 2 ML | Refills: 11 | Status: SHIPPED | OUTPATIENT
Start: 2024-11-05

## 2024-11-05 NOTE — TELEPHONE ENCOUNTER
Last OV 04/11/2024. Pt contacting us again in regards of her Semaglutide would like an increase on dose currently on 1.8 mg. Would like it sent to [facility]. Please advice.

## 2024-11-15 DIAGNOSIS — F51.01 PRIMARY INSOMNIA: ICD-10-CM

## 2024-11-15 DIAGNOSIS — N94.6 PAINFUL MENSTRUAL PERIODS: ICD-10-CM

## 2024-11-15 RX ORDER — TRAZODONE HYDROCHLORIDE 150 MG/1
150 TABLET ORAL NIGHTLY
Qty: 90 TABLET | Refills: 2 | Status: SHIPPED | OUTPATIENT
Start: 2024-11-15

## 2024-11-15 RX ORDER — NORETHINDRONE 0.35 MG/1
1 TABLET ORAL DAILY
Qty: 84 TABLET | Refills: 3 | Status: SHIPPED | OUTPATIENT
Start: 2024-11-15

## 2024-11-15 NOTE — TELEPHONE ENCOUNTER
Pt requesting refill of     IBUPROFEN 800 (not on her chart)  TRAZODONE  BIRTH CONTROL    90 DAYS FOR EACH

## 2024-11-19 DIAGNOSIS — Z00.00 ROUTINE GENERAL MEDICAL EXAMINATION AT A HEALTH CARE FACILITY: Primary | ICD-10-CM

## 2024-11-19 RX ORDER — IBUPROFEN 800 MG/1
800 TABLET ORAL EVERY 8 HOURS PRN
Qty: 90 TABLET | Refills: 0 | Status: SHIPPED | OUTPATIENT
Start: 2024-11-19

## 2024-11-19 RX ORDER — IBUPROFEN 800 MG/1
800 TABLET ORAL EVERY 8 HOURS PRN
COMMUNITY
End: 2024-11-19 | Stop reason: SDUPTHER

## 2024-12-10 ENCOUNTER — TELEPHONE (OUTPATIENT)
Dept: PRIMARY CARE | Facility: CLINIC | Age: 39
End: 2024-12-10
Payer: COMMERCIAL

## 2024-12-10 DIAGNOSIS — E66.09 CLASS 2 OBESITY DUE TO EXCESS CALORIES WITHOUT SERIOUS COMORBIDITY WITH BODY MASS INDEX (BMI) OF 37.0 TO 37.9 IN ADULT: ICD-10-CM

## 2024-12-10 DIAGNOSIS — E66.812 CLASS 2 OBESITY DUE TO EXCESS CALORIES WITHOUT SERIOUS COMORBIDITY WITH BODY MASS INDEX (BMI) OF 37.0 TO 37.9 IN ADULT: ICD-10-CM

## 2024-12-10 RX ORDER — SEMAGLUTIDE 1 MG/.5ML
1 INJECTION, SOLUTION SUBCUTANEOUS
Qty: 2 ML | Refills: 11 | Status: SHIPPED | OUTPATIENT
Start: 2024-12-10

## 2025-03-04 DIAGNOSIS — E66.09 CLASS 2 OBESITY DUE TO EXCESS CALORIES WITHOUT SERIOUS COMORBIDITY WITH BODY MASS INDEX (BMI) OF 37.0 TO 37.9 IN ADULT: ICD-10-CM

## 2025-03-04 DIAGNOSIS — E66.812 CLASS 2 OBESITY DUE TO EXCESS CALORIES WITHOUT SERIOUS COMORBIDITY WITH BODY MASS INDEX (BMI) OF 37.0 TO 37.9 IN ADULT: ICD-10-CM

## 2025-03-04 RX ORDER — SEMAGLUTIDE 1 MG/.5ML
1 INJECTION, SOLUTION SUBCUTANEOUS
Qty: 2 ML | Refills: 11 | Status: SHIPPED | OUTPATIENT
Start: 2025-03-04

## 2025-04-02 DIAGNOSIS — Z00.00 ROUTINE GENERAL MEDICAL EXAMINATION AT A HEALTH CARE FACILITY: ICD-10-CM

## 2025-04-02 RX ORDER — IBUPROFEN 800 MG/1
800 TABLET ORAL EVERY 8 HOURS PRN
Qty: 90 TABLET | Refills: 0 | Status: SHIPPED | OUTPATIENT
Start: 2025-04-02

## 2025-04-02 NOTE — TELEPHONE ENCOUNTER
[facility] pharmacy requested a refill on Ibuprofen  Request has been sent to Dr Diaz for approval.  Last OV 4/2024  Pt is due for an appt before further refills.     Thank you

## 2025-04-05 LAB
25(OH)D3+25(OH)D2 SERPL-MCNC: 34 NG/ML (ref 30–100)
ALBUMIN SERPL-MCNC: 4.1 G/DL (ref 3.6–5.1)
ALP SERPL-CCNC: 58 U/L (ref 31–125)
ALT SERPL-CCNC: 10 U/L (ref 6–29)
ANION GAP SERPL CALCULATED.4IONS-SCNC: 10 MMOL/L (CALC) (ref 7–17)
AST SERPL-CCNC: 11 U/L (ref 10–30)
BASOPHILS # BLD AUTO: 41 CELLS/UL (ref 0–200)
BASOPHILS NFR BLD AUTO: 0.7 %
BILIRUB SERPL-MCNC: 0.6 MG/DL (ref 0.2–1.2)
BUN SERPL-MCNC: 10 MG/DL (ref 7–25)
CALCIUM SERPL-MCNC: 8.9 MG/DL (ref 8.6–10.2)
CHLORIDE SERPL-SCNC: 104 MMOL/L (ref 98–110)
CHOLEST SERPL-MCNC: 161 MG/DL
CHOLEST/HDLC SERPL: 3.5 (CALC)
CO2 SERPL-SCNC: 23 MMOL/L (ref 20–32)
CREAT SERPL-MCNC: 0.9 MG/DL (ref 0.5–0.99)
EGFRCR SERPLBLD CKD-EPI 2021: 82 ML/MIN/1.73M2
EOSINOPHIL # BLD AUTO: 41 CELLS/UL (ref 15–500)
EOSINOPHIL NFR BLD AUTO: 0.7 %
ERYTHROCYTE [DISTWIDTH] IN BLOOD BY AUTOMATED COUNT: 12.2 % (ref 11–15)
GLUCOSE SERPL-MCNC: 76 MG/DL (ref 65–99)
HCT VFR BLD AUTO: 41.8 % (ref 35–45)
HDLC SERPL-MCNC: 46 MG/DL
HGB BLD-MCNC: 13.9 G/DL (ref 11.7–15.5)
LDLC SERPL CALC-MCNC: 98 MG/DL (CALC)
LYMPHOCYTES # BLD AUTO: 2134 CELLS/UL (ref 850–3900)
LYMPHOCYTES NFR BLD AUTO: 36.8 %
MAGNESIUM SERPL-MCNC: 2 MG/DL (ref 1.5–2.5)
MCH RBC QN AUTO: 31.2 PG (ref 27–33)
MCHC RBC AUTO-ENTMCNC: 33.3 G/DL (ref 32–36)
MCV RBC AUTO: 93.7 FL (ref 80–100)
MONOCYTES # BLD AUTO: 574 CELLS/UL (ref 200–950)
MONOCYTES NFR BLD AUTO: 9.9 %
NEUTROPHILS # BLD AUTO: 3010 CELLS/UL (ref 1500–7800)
NEUTROPHILS NFR BLD AUTO: 51.9 %
NONHDLC SERPL-MCNC: 115 MG/DL (CALC)
PLATELET # BLD AUTO: 294 THOUSAND/UL (ref 140–400)
PMV BLD REES-ECKER: 10.8 FL (ref 7.5–12.5)
POTASSIUM SERPL-SCNC: 4.4 MMOL/L (ref 3.5–5.3)
PROT SERPL-MCNC: 6.7 G/DL (ref 6.1–8.1)
RBC # BLD AUTO: 4.46 MILLION/UL (ref 3.8–5.1)
SODIUM SERPL-SCNC: 137 MMOL/L (ref 135–146)
TRIGL SERPL-MCNC: 84 MG/DL
TSH SERPL-ACNC: 1.49 MIU/L
WBC # BLD AUTO: 5.8 THOUSAND/UL (ref 3.8–10.8)

## 2025-04-06 LAB
HCV AB SERPL QL IA: NORMAL
HIV 1+2 AB+HIV1 P24 AG SERPL QL IA: NORMAL

## 2025-05-05 ENCOUNTER — APPOINTMENT (OUTPATIENT)
Dept: OBSTETRICS AND GYNECOLOGY | Facility: CLINIC | Age: 40
End: 2025-05-05
Payer: COMMERCIAL

## 2025-05-27 DIAGNOSIS — E66.812 CLASS 2 OBESITY DUE TO EXCESS CALORIES WITHOUT SERIOUS COMORBIDITY WITH BODY MASS INDEX (BMI) OF 37.0 TO 37.9 IN ADULT: ICD-10-CM

## 2025-05-27 DIAGNOSIS — E66.09 CLASS 2 OBESITY DUE TO EXCESS CALORIES WITHOUT SERIOUS COMORBIDITY WITH BODY MASS INDEX (BMI) OF 37.0 TO 37.9 IN ADULT: ICD-10-CM

## 2025-05-28 RX ORDER — SEMAGLUTIDE 1 MG/.5ML
1 INJECTION, SOLUTION SUBCUTANEOUS
Qty: 2 ML | Refills: 11 | Status: SHIPPED | OUTPATIENT
Start: 2025-06-01

## 2025-06-04 ENCOUNTER — APPOINTMENT (OUTPATIENT)
Dept: PRIMARY CARE | Facility: CLINIC | Age: 40
End: 2025-06-04
Payer: COMMERCIAL

## 2025-06-09 ENCOUNTER — APPOINTMENT (OUTPATIENT)
Dept: OBSTETRICS AND GYNECOLOGY | Facility: CLINIC | Age: 40
End: 2025-06-09
Payer: COMMERCIAL

## 2025-06-09 VITALS
DIASTOLIC BLOOD PRESSURE: 68 MMHG | WEIGHT: 188.8 LBS | HEART RATE: 83 BPM | BODY MASS INDEX: 30.34 KG/M2 | SYSTOLIC BLOOD PRESSURE: 91 MMHG | HEIGHT: 66 IN

## 2025-06-09 DIAGNOSIS — N93.9 ABNORMAL UTERINE BLEEDING (AUB): ICD-10-CM

## 2025-06-09 DIAGNOSIS — B00.9 HSV-2 INFECTION: ICD-10-CM

## 2025-06-09 DIAGNOSIS — Z30.431 ENCOUNTER FOR MANAGEMENT OF INTRAUTERINE CONTRACEPTIVE DEVICE (IUD), UNSPECIFIED IUD MANAGEMENT TYPE: ICD-10-CM

## 2025-06-09 DIAGNOSIS — Z01.419 WELL WOMAN EXAM WITH ROUTINE GYNECOLOGICAL EXAM: ICD-10-CM

## 2025-06-09 DIAGNOSIS — Z12.4 PAP SMEAR FOR CERVICAL CANCER SCREENING: ICD-10-CM

## 2025-06-09 DIAGNOSIS — N92.1 MENORRHAGIA WITH IRREGULAR CYCLE: ICD-10-CM

## 2025-06-09 DIAGNOSIS — Z12.31 ENCOUNTER FOR SCREENING MAMMOGRAM FOR MALIGNANT NEOPLASM OF BREAST: ICD-10-CM

## 2025-06-09 DIAGNOSIS — Z01.411 ENCNTR FOR GYN EXAM (GENERAL) (ROUTINE) W ABNORMAL FINDINGS: Primary | ICD-10-CM

## 2025-06-09 DIAGNOSIS — N94.6 PAINFUL MENSTRUAL PERIODS: ICD-10-CM

## 2025-06-09 DIAGNOSIS — N85.2 ENLARGED UTERUS: ICD-10-CM

## 2025-06-09 PROCEDURE — 3008F BODY MASS INDEX DOCD: CPT | Performed by: ADVANCED PRACTICE MIDWIFE

## 2025-06-09 PROCEDURE — 87626 HPV SEP HI-RSK TYP&POOL RSLT: CPT

## 2025-06-09 PROCEDURE — 1036F TOBACCO NON-USER: CPT | Performed by: ADVANCED PRACTICE MIDWIFE

## 2025-06-09 PROCEDURE — 99396 PREV VISIT EST AGE 40-64: CPT | Performed by: ADVANCED PRACTICE MIDWIFE

## 2025-06-09 RX ORDER — VALACYCLOVIR HYDROCHLORIDE 500 MG/1
TABLET, FILM COATED ORAL
Qty: 6 TABLET | Refills: 5 | Status: SHIPPED | OUTPATIENT
Start: 2025-06-09 | End: 2026-06-09

## 2025-06-09 RX ORDER — NORETHINDRONE 0.35 MG/1
1 TABLET ORAL DAILY
Qty: 84 TABLET | Refills: 3 | Status: SHIPPED | OUTPATIENT
Start: 2025-06-09

## 2025-06-09 NOTE — PROGRESS NOTES
"GYNECOLOGY PROGRESS NOTE        CC:  see below. Patient using POP but she does not have a regular cycle at all. She has random bleeding througout the month. She continues to have heavy and painful menses. Patient interested in an IUD. D/O possible causes of her bleeding and options to control her bleeding/menses. She is interested in a Mirena IUD. D/O evaluated her bleeding, having her obtain a sonogram, having her scheduled for a hysteroscopic exam and then the providers can discuss options for bleeding management, Including Mirena IUD. She does have 1 HSV outbreak per year. Only takes the Rx when the outbreak occurs. No breast issues.  Chief Complaint   Patient presents with    Annual Exam     Patient presents for Annual Exam.  Pap: Patient reports 2019: normal.  [place]:06/21/2024: Cat 1 Neg  C/o Abnormal uterine bleeding with OCP's would like to discuss getting IUD and what the different options are.        HPI:  Ruma Barrios is here for a routine GYN examination.  C/o AUB and pain even with POP use.            ROS:  GI - no blood in BMs  URO - no hematuria  GYN - no vaginal discharge  PSYCH - mood OK        PHYSICAL EXAM:  BP 91/68 (BP Location: Left arm, Patient Position: Sitting, BP Cuff Size: Large adult)   Pulse 83   Ht 1.676 m (5' 6\")   Wt 85.6 kg (188 lb 12.8 oz)   LMP 05/24/2025   BMI 30.47 kg/m²   GEN:  A&O, NAD  URO:  normal urethra, no bladder TTP  GYN:  normal vulva and perineum w/o lesions or ulcers, normal vagina without discharge or lesions, normal cervix without lesions or discharge or CMT, uterus enlarged at 16 week size, firm. adnexa mobile and NT/NE  BREAST:  no masses or TTP, no skin lesions or nipple discharge  PSYCH:  normal affect, non-anxious      IMPRESSION/PLAN:  A: AUB even with POP use. Heavy and random bleeding. Enlarged uterus noted 16 week size,firm. HSV outbreak 1 x per year  Plan: 1. Pap done. 2. Mammogram ordered. 3. Pelvic sonogram ordered. 4. Continue on POP until after " hysteroscopic exam. 5. Scheduled hysteroscopic exam, considering using nitrous for the procedure. 6. Interested in an IUD if that is an option for bleeding. 7. Refilled POP. 8. Refilled valtrex for outbreaks only.  Problem List Items Addressed This Visit    None  Visit Diagnoses         Well woman exam with routine gynecological exam          Encounter for management of intrauterine contraceptive device (IUD), unspecified IUD management type          Abnormal uterine bleeding (AUB)                ASCCP pap smear screening guidelines reviewed with the patient.    Mammogram up to date and normal.       MOLLY Nettles

## 2025-06-10 ENCOUNTER — TELEPHONE (OUTPATIENT)
Dept: OBSTETRICS AND GYNECOLOGY | Facility: CLINIC | Age: 40
End: 2025-06-10
Payer: COMMERCIAL

## 2025-06-10 NOTE — TELEPHONE ENCOUNTER
Spoke with Pt. Confirmed she would like to use NO2 for upcoming hysteroscopy in the office on 7/23. Pt made aware and consented to $100 fee due at the time of visit for the nitrous oxide as it is not billed to insurance. Pt educated on procedure and cervical block for procedure, and the use and effects of NO2.   NO2 pre-procedure check list completed with Pt and documented below. Pt denies further questions or concerns.   Nitrous Oxide Pre-Procedure Checklist     Type of Procedure: in-office hysteroscopy 7/23/2025    Are you able to hold the mouthpiece you will be breathing the nitrous through without assistance?   Yes [x]  No []    Are you currently pregnant?  Yes []  No [x]    Do you have or suspect you have a vitamin B12 deficiency?  Yes []  No [x]    Have you been diagnosed with sleep apnea and use a CPAP machine at night?  Yes []  No [x]    Are you on oxygen?  Yes []  No [x]    Do you currently have a pneumothorax?  Yes []  No [x]    Do you currently have an air embolism?  Yes []  No [x]    Do you currently have decompression sickness?  Yes []  No [x]    Have you been scuba diving in the last 6 weeks?  Yes []  No [x]    Have you had issues with your blood pressure being too low in the last 3 months?  Yes []  No [x]    Have you been diagnosed with pulmonary hypertension?  Yes []  No [x]    Do you have an intestinal obstruction or intra-abdominal air?  Yes []  No [x]    Have you had any eye surgery in the last 6 weeks?  Yes []  No [x]    Do you currently have an ear infection?  Yes []  No [x]    Is Pt BMI less than 50?   Yes [x]  No []    Pt directed not to take any prescribed or non-prescribed medications or substances that cause alter consciousness prior to appointment, Pt RAYNA

## 2025-06-17 ENCOUNTER — APPOINTMENT (OUTPATIENT)
Dept: PRIMARY CARE | Facility: CLINIC | Age: 40
End: 2025-06-17
Payer: COMMERCIAL

## 2025-06-17 VITALS
BODY MASS INDEX: 30.37 KG/M2 | WEIGHT: 189 LBS | HEART RATE: 83 BPM | TEMPERATURE: 97.3 F | SYSTOLIC BLOOD PRESSURE: 112 MMHG | DIASTOLIC BLOOD PRESSURE: 72 MMHG | HEIGHT: 66 IN | OXYGEN SATURATION: 97 % | RESPIRATION RATE: 16 BRPM

## 2025-06-17 DIAGNOSIS — E66.09 CLASS 1 OBESITY DUE TO EXCESS CALORIES WITHOUT SERIOUS COMORBIDITY WITH BODY MASS INDEX (BMI) OF 30.0 TO 30.9 IN ADULT: ICD-10-CM

## 2025-06-17 DIAGNOSIS — R42 VERTIGO: ICD-10-CM

## 2025-06-17 DIAGNOSIS — E55.9 VITAMIN D DEFICIENCY: ICD-10-CM

## 2025-06-17 DIAGNOSIS — F51.01 PRIMARY INSOMNIA: ICD-10-CM

## 2025-06-17 DIAGNOSIS — Z00.00 ROUTINE GENERAL MEDICAL EXAMINATION AT A HEALTH CARE FACILITY: Primary | ICD-10-CM

## 2025-06-17 DIAGNOSIS — E66.811 CLASS 1 OBESITY DUE TO EXCESS CALORIES WITHOUT SERIOUS COMORBIDITY WITH BODY MASS INDEX (BMI) OF 30.0 TO 30.9 IN ADULT: ICD-10-CM

## 2025-06-17 PROCEDURE — 3008F BODY MASS INDEX DOCD: CPT | Performed by: FAMILY MEDICINE

## 2025-06-17 PROCEDURE — 99396 PREV VISIT EST AGE 40-64: CPT | Performed by: FAMILY MEDICINE

## 2025-06-17 RX ORDER — IBUPROFEN 800 MG/1
800 TABLET, FILM COATED ORAL EVERY 8 HOURS PRN
Qty: 90 TABLET | Refills: 11 | Status: SHIPPED | OUTPATIENT
Start: 2025-06-17

## 2025-06-17 RX ORDER — MECLIZINE HYDROCHLORIDE 25 MG/1
25 TABLET ORAL 3 TIMES DAILY PRN
Qty: 30 TABLET | Refills: 11 | Status: SHIPPED | OUTPATIENT
Start: 2025-06-17 | End: 2026-06-17

## 2025-06-17 RX ORDER — ONDANSETRON 4 MG/1
4 TABLET, ORALLY DISINTEGRATING ORAL EVERY 12 HOURS PRN
Qty: 30 TABLET | Refills: 3 | Status: SHIPPED | OUTPATIENT
Start: 2025-06-17

## 2025-06-17 RX ORDER — TRAZODONE HYDROCHLORIDE 150 MG/1
150 TABLET ORAL NIGHTLY PRN
Qty: 30 TABLET | Refills: 11 | Status: SHIPPED | OUTPATIENT
Start: 2025-06-17 | End: 2026-06-17

## 2025-06-17 RX ORDER — SEMAGLUTIDE 1 MG/.5ML
1 INJECTION, SOLUTION SUBCUTANEOUS
Qty: 2 ML | Refills: 1 | Status: SHIPPED | OUTPATIENT
Start: 2025-06-17 | End: 2025-06-19 | Stop reason: SDUPTHER

## 2025-06-17 ASSESSMENT — ENCOUNTER SYMPTOMS
BRUISES/BLEEDS EASILY: 0
ABDOMINAL DISTENTION: 0
BACK PAIN: 0
SLEEP DISTURBANCE: 0
FLANK PAIN: 0
ACTIVITY CHANGE: 0
DYSPHORIC MOOD: 0
APPETITE CHANGE: 0
RHINORRHEA: 0
ADENOPATHY: 0
VOICE CHANGE: 0
TROUBLE SWALLOWING: 0
WHEEZING: 0
CONSTIPATION: 0
AGITATION: 0
HALLUCINATIONS: 0
WOUND: 0
BLOOD IN STOOL: 0
SINUS PRESSURE: 0
DYSURIA: 0
NECK STIFFNESS: 0
DIARRHEA: 0
UNEXPECTED WEIGHT CHANGE: 0
HEMATURIA: 0
EYE PAIN: 0
FREQUENCY: 0
SHORTNESS OF BREATH: 0
EYE DISCHARGE: 0
EYE ITCHING: 0
PALPITATIONS: 0
EYE REDNESS: 0
POLYDIPSIA: 0
PHOTOPHOBIA: 0
CHOKING: 0
CHEST TIGHTNESS: 0

## 2025-06-17 ASSESSMENT — PATIENT HEALTH QUESTIONNAIRE - PHQ9
1. LITTLE INTEREST OR PLEASURE IN DOING THINGS: NOT AT ALL
2. FEELING DOWN, DEPRESSED OR HOPELESS: NOT AT ALL
SUM OF ALL RESPONSES TO PHQ9 QUESTIONS 1 AND 2: 0

## 2025-06-17 NOTE — PROGRESS NOTES
Subjective   Patient ID: Ruma Barrios is a 40 y.o. female who presents for Annual Exam (Pt reports no questions nor concerns) and Med Management (Needs new Rx for buderer's new formula).    Subjective  Ruma Barrios is a 40 y.o. female and is here for a comprehensive physical exam. The patient reports patient reports overall vertigo has improved but was unable to maintain vestibular rehab but medications are adequate with controlling symptomatology.  Patient would like assistance with tapering off of trazodone..    Do you take any herbs or supplements that were not prescribed by a doctor? no  Are you taking calcium supplements? no  Are you taking aspirin daily? no      History:  LMP: Patient's last menstrual period was 05/24/2025.  Last pap date: 6/9/2025         Review of Systems   Constitutional:  Negative for activity change, appetite change and unexpected weight change.   HENT:  Negative for ear pain, hearing loss, nosebleeds, postnasal drip, rhinorrhea, sinus pressure, sneezing, tinnitus, trouble swallowing and voice change.    Eyes:  Negative for photophobia, pain, discharge, redness, itching and visual disturbance.   Respiratory:  Negative for choking, chest tightness, shortness of breath and wheezing.    Cardiovascular:  Negative for palpitations and leg swelling.   Gastrointestinal:  Negative for abdominal distention, blood in stool, constipation and diarrhea.   Endocrine: Negative for cold intolerance, heat intolerance, polydipsia and polyuria.   Genitourinary:  Negative for dysuria, flank pain, frequency, hematuria and urgency.   Musculoskeletal:  Negative for back pain and neck stiffness.   Skin:  Negative for wound.   Allergic/Immunologic: Negative for immunocompromised state.   Hematological:  Negative for adenopathy. Does not bruise/bleed easily.   Psychiatric/Behavioral:  Negative for agitation, behavioral problems, dysphoric mood, hallucinations, self-injury, sleep disturbance and suicidal  "ideas.      Objective   /72   Pulse 83   Temp 36.3 °C (97.3 °F) (Temporal)   Resp 16   Ht 1.676 m (5' 6\")   Wt 85.7 kg (189 lb)   LMP 05/24/2025   SpO2 97%   BMI 30.51 kg/m²     Physical Exam  Constitutional:       General: She is not in acute distress.     Appearance: Normal appearance. She is obese. She is not ill-appearing or diaphoretic.   HENT:      Head: Normocephalic and atraumatic.      Right Ear: External ear normal.      Left Ear: External ear normal.      Nose: Nose normal. No rhinorrhea.      Mouth/Throat:      Mouth: Mucous membranes are moist.   Eyes:      General: Lids are normal. No scleral icterus.        Right eye: No discharge.         Left eye: No discharge.      Conjunctiva/sclera: Conjunctivae normal.   Cardiovascular:      Rate and Rhythm: Normal rate and regular rhythm.      Pulses: Normal pulses.      Heart sounds: No murmur heard.  Pulmonary:      Effort: Pulmonary effort is normal. No respiratory distress.      Breath sounds: No decreased breath sounds, wheezing, rhonchi or rales.   Abdominal:      General: Bowel sounds are normal. There is no distension.      Palpations: Abdomen is soft. There is no mass.      Tenderness: There is no abdominal tenderness. There is no guarding or rebound.   Musculoskeletal:         General: No swelling, tenderness or deformity.      Cervical back: Normal range of motion. No rigidity or tenderness.      Right lower leg: No edema.      Left lower leg: No edema.   Lymphadenopathy:      Cervical: No cervical adenopathy.      Upper Body:      Right upper body: No supraclavicular adenopathy.      Left upper body: No supraclavicular adenopathy.   Skin:     General: Skin is warm and dry.      Coloration: Skin is not jaundiced or pale.      Findings: No erythema, lesion or rash.   Neurological:      General: No focal deficit present.      Mental Status: She is alert and oriented to person, place, and time.      Sensory: No sensory deficit.      Motor: " No weakness or tremor.      Coordination: Coordination normal.      Gait: Gait normal.   Psychiatric:         Mood and Affect: Mood normal. Affect is not inappropriate.         Behavior: Behavior normal.         Thought Content: Thought content normal.       Assessment/Plan   Diagnoses and all orders for this visit:  Routine general medical examination at a health care facility  -     ibuprofen 800 mg tablet; Take 1 tablet (800 mg) by mouth every 8 hours if needed for mild pain (1 - 3).  -     CBC and Auto Differential; Future  -     Comprehensive Metabolic Panel; Future  -     Lipid Panel; Future  -     Magnesium; Future  -     TSH with reflex to Free T4 if abnormal; Future  -     Vitamin D 25-Hydroxy,Total (for eval of Vitamin D levels); Future  -     Follow Up In [facility] - Health Maintenance; Future  Primary insomnia  -     traZODone (Desyrel) 150 mg tablet; Take 1 tablet (150 mg) by mouth as needed at bedtime for sleep.  -     Follow Up In [facility] - Health Maintenance; Future  Vertigo  -     ondansetron ODT (Zofran-ODT) 4 mg disintegrating tablet; Dissolve 1 tablet (4 mg) in the mouth every 12 hours if needed for nausea or vomiting.  -     meclizine (Antivert) 25 mg tablet; Take 1 tablet (25 mg) by mouth 3 times a day as needed for dizziness.  -     CBC and Auto Differential; Future  -     Comprehensive Metabolic Panel; Future  -     Magnesium; Future  -     TSH with reflex to Free T4 if abnormal; Future  -     Follow Up In [facility] - Health Maintenance; Future  Vitamin D deficiency  -     Comprehensive Metabolic Panel; Future  -     Magnesium; Future  -     Vitamin D 25-Hydroxy,Total (for eval of Vitamin D levels); Future  -     Follow Up In [facility] - Health Maintenance; Future  Class 1 obesity due to excess calories without serious comorbidity with body mass index (BMI) of 30.0 to 30.9 in adult  -     semaglutide, weight loss,  (Wegovy) 1 mg/0.5 mL pen injector; Inject 1 mg under the skin 1 (one) time per week.     2. Patient Counseling:  --Nutrition: Stressed importance of moderation in sodium/caffeine intake, saturated fat and cholesterol, caloric balance, sufficient intake of fresh fruits, vegetables, fiber, calcium, iron.  --Exercise: Stressed the importance of regular exercise.   --Substance Abuse: Discussed cessation/primary prevention of tobacco, alcohol, or other drug use; driving or other dangerous activities under the influence; availability of treatment for abuse.   --Injury prevention: Discussed safety belts, safety helmets, smoke detector, smoking near bedding or upholstery.   --Dental health: Discussed importance of regular tooth brushing, flossing, and dental visits.  --Immunizations reviewed.  --Discussed benefits of screening colonoscopy.  Due age 44  3. Discussed the patient's BMI with her.  The BMI is above average. The patient received Current weight: 85.7 kg (189 lb)  Weight change since last visit (-) denotes wt loss 0.2 lbs   Weight loss needed to achieve BMI 25: 34.4 Lbs  Weight loss needed to achieve BMI 30: 3.5 Lbs    Provided instructions on dietary changes  Provided instructions on exercise  Advised to Increase physical activity because they have an above normal BMI.  4. Follow up in one year

## 2025-06-17 NOTE — PROGRESS NOTES
"Subjective   Patient ID: Ruma Barrios is a 40 y.o. female who presents for Annual Exam (Pt reports no questions nor concerns) and Med Management (Needs new Rx for buderer's new formula).    HPI     Review of Systems    Objective   /72   Pulse 83   Temp 36.3 °C (97.3 °F) (Temporal)   Resp 16   Ht 1.676 m (5' 6\")   Wt 85.7 kg (189 lb)   LMP 05/24/2025   SpO2 97%   BMI 30.51 kg/m²     Physical Exam    Assessment/Plan          "

## 2025-06-19 ENCOUNTER — TELEPHONE (OUTPATIENT)
Dept: PRIMARY CARE | Facility: CLINIC | Age: 40
End: 2025-06-19
Payer: COMMERCIAL

## 2025-06-19 DIAGNOSIS — E66.09 CLASS 1 OBESITY DUE TO EXCESS CALORIES WITHOUT SERIOUS COMORBIDITY WITH BODY MASS INDEX (BMI) OF 30.0 TO 30.9 IN ADULT: ICD-10-CM

## 2025-06-19 DIAGNOSIS — E66.811 CLASS 1 OBESITY DUE TO EXCESS CALORIES WITHOUT SERIOUS COMORBIDITY WITH BODY MASS INDEX (BMI) OF 30.0 TO 30.9 IN ADULT: ICD-10-CM

## 2025-06-19 RX ORDER — SEMAGLUTIDE 1 MG/.5ML
1 INJECTION, SOLUTION SUBCUTANEOUS
Qty: 2 ML | Refills: 1 | Status: SHIPPED | OUTPATIENT
Start: 2025-06-22

## 2025-06-19 NOTE — TELEPHONE ENCOUNTER
Patient phones the office today as a follow up to her appointment w/ TW on Tuesday, 6-17-25.     Patient states her Semaglutide (Wegovy) was sent to the wrong pharmacy and she would like this to be resent to [facility] please.     Thank you.

## 2025-06-23 ENCOUNTER — HOSPITAL ENCOUNTER (OUTPATIENT)
Dept: RADIOLOGY | Facility: HOSPITAL | Age: 40
Discharge: HOME | End: 2025-06-23
Payer: COMMERCIAL

## 2025-06-23 VITALS — WEIGHT: 189 LBS | BODY MASS INDEX: 30.51 KG/M2

## 2025-06-23 DIAGNOSIS — Z12.31 ENCOUNTER FOR SCREENING MAMMOGRAM FOR MALIGNANT NEOPLASM OF BREAST: ICD-10-CM

## 2025-06-23 DIAGNOSIS — N93.9 ABNORMAL UTERINE BLEEDING (AUB): ICD-10-CM

## 2025-06-23 DIAGNOSIS — N92.1 MENORRHAGIA WITH IRREGULAR CYCLE: ICD-10-CM

## 2025-06-23 DIAGNOSIS — N85.2 ENLARGED UTERUS: ICD-10-CM

## 2025-06-23 PROCEDURE — 76856 US EXAM PELVIC COMPLETE: CPT | Performed by: RADIOLOGY

## 2025-06-23 PROCEDURE — 76856 US EXAM PELVIC COMPLETE: CPT

## 2025-06-23 PROCEDURE — 76830 TRANSVAGINAL US NON-OB: CPT | Performed by: RADIOLOGY

## 2025-06-23 PROCEDURE — 77063 BREAST TOMOSYNTHESIS BI: CPT | Performed by: RADIOLOGY

## 2025-06-23 PROCEDURE — 77063 BREAST TOMOSYNTHESIS BI: CPT

## 2025-06-23 PROCEDURE — 77067 SCR MAMMO BI INCL CAD: CPT | Performed by: RADIOLOGY

## 2025-07-16 ENCOUNTER — TELEPHONE (OUTPATIENT)
Dept: PRIMARY CARE | Facility: CLINIC | Age: 40
End: 2025-07-16
Payer: COMMERCIAL

## 2025-07-16 DIAGNOSIS — E66.09 CLASS 1 OBESITY DUE TO EXCESS CALORIES WITHOUT SERIOUS COMORBIDITY WITH BODY MASS INDEX (BMI) OF 30.0 TO 30.9 IN ADULT: ICD-10-CM

## 2025-07-16 DIAGNOSIS — E66.811 CLASS 1 OBESITY DUE TO EXCESS CALORIES WITHOUT SERIOUS COMORBIDITY WITH BODY MASS INDEX (BMI) OF 30.0 TO 30.9 IN ADULT: ICD-10-CM

## 2025-07-16 RX ORDER — SEMAGLUTIDE 1 MG/.5ML
1 INJECTION, SOLUTION SUBCUTANEOUS
Qty: 2 ML | Refills: 11 | Status: SHIPPED | OUTPATIENT
Start: 2025-07-20

## 2025-07-23 ENCOUNTER — APPOINTMENT (OUTPATIENT)
Dept: OBSTETRICS AND GYNECOLOGY | Facility: CLINIC | Age: 40
End: 2025-07-23
Payer: COMMERCIAL

## 2025-07-23 VITALS
BODY MASS INDEX: 30.05 KG/M2 | WEIGHT: 187 LBS | HEIGHT: 66 IN | DIASTOLIC BLOOD PRESSURE: 78 MMHG | SYSTOLIC BLOOD PRESSURE: 100 MMHG

## 2025-07-23 DIAGNOSIS — D21.9 FIBROID: Primary | ICD-10-CM

## 2025-07-23 DIAGNOSIS — N93.9 ABNORMAL UTERINE BLEEDING (AUB): ICD-10-CM

## 2025-07-23 DIAGNOSIS — N85.2 ENLARGED UTERUS: ICD-10-CM

## 2025-07-23 DIAGNOSIS — N94.6 DYSMENORRHEA: ICD-10-CM

## 2025-07-23 LAB — PREGNANCY TEST URINE, POC: NEGATIVE

## 2025-07-23 PROCEDURE — 58558 HYSTEROSCOPY BIOPSY: CPT | Performed by: OBSTETRICS & GYNECOLOGY

## 2025-07-23 PROCEDURE — 81025 URINE PREGNANCY TEST: CPT | Performed by: OBSTETRICS & GYNECOLOGY

## 2025-07-23 RX ORDER — BUPIVACAINE HYDROCHLORIDE 2.5 MG/ML
10 INJECTION, SOLUTION INFILTRATION; PERINEURAL ONCE
Status: COMPLETED | OUTPATIENT
Start: 2025-07-23 | End: 2025-07-23

## 2025-07-23 RX ADMIN — BUPIVACAINE HYDROCHLORIDE 25 MG: 2.5 INJECTION, SOLUTION INFILTRATION; PERINEURAL at 10:37

## 2025-07-23 NOTE — PROGRESS NOTES
"GYNECOLOGY PROGRESS NOTE WITH PROCEDURE        CC:     Chief Complaint   Patient presents with    Procedure     New patient - H/S EMB  Preg test neg  Chaperone hillary arevalo ma          HPI:  Ruma Barrios is here for office hysteroscopy with EMB for ABU--currently not bleeding.  Rx=BCPs.   No children, no pregnancy plans ever.  GYN symptoms include heavy periods and irregular periods and pain with her periods for the last year.  She is also noticing abdominal swelling over the same time period.  Her periods have chronically been heavy but they have been controlled told her birth control pills until the last year.  Other than the abdominal swelling and some mild constipation she has no other bulk symptoms such as urinary frequency or urinary retention.   She has no prior history of uterine fibroids.  She has no history of GYN surgeries or abdominal surgeries.  No new GYN complaints.      ROS:  GYN - see HPI  URO - normal voids  GI - + mild constipation      HISTORY:  Past Surgical History:  No date: NASAL SEPTUM SURGERY  No date: TONSILLECTOMY      PHYSICAL EXAM:  /78 (BP Location: Left arm, Patient Position: Sitting)   Ht 1.676 m (5' 6\")   Wt 84.8 kg (187 lb)   LMP 07/12/2025   BMI 30.18 kg/m²   GEN:  A&O, NAD  ABD:  soft, NT/ND, fundal height at 2 cm below umbilicus with palpable fibroids  URO:  normal urethral meatus  GYN:  normal vulva and perineum w/o lesions or ulcers, normal vagina without discharge or lesions, normal cervix without lesions or discharge--no blood present; uterus enlarged but NT and mobile;  adnexa not palpable but no gross masses or TTP  PSYCH:  normal affect, non-anxious       LAB RESULTS:  Lab Results   Component Value Date    WBC 5.8 04/04/2025    HGB 13.9 04/04/2025    HCT 41.8 04/04/2025    MCV 93.7 04/04/2025     04/04/2025     Lab Results   Component Value Date    TSH 1.49 04/04/2025       PATH RESULTS:  6/9/25 - pap/HPV wnl      IMAGING RESULTS:    06/23/25 - US PELVIS " TRANSABDOMINAL WITH TRANSVAGINAL    - Impression -  Enlarged multifibroid uterus. Suspected cystic degeneration of  fibroids.    Heterogeneous appearance of the uterine myometrium could be due to  fibroids or adenomyosis. MRI may further evaluate as warranted.      **I personally reviewed the pelvic ultrasound images and my impressions are enlarged uterus with fibroids including dominant posterior fundal 1 measuring 11.7 cm and separate anterior intramural fibroid measuring 4.0 cm, endometrium poorly visualized by shadowing from fibroids, ovaries normal, no free fluid.    ----------------------------------------------------------------------------    HYSTEROSCOPY WITH ENDOMETRIAL SAMPLING PROCEDURE NOTE  Patient's pre-procedure questions were answered and a written informed consent form was signed.   Patient was placed in lithotomy position on the procedure room table.  A speculum was placed in the vagina. The cervix was cleansed × 3 with Betadine.  A paracervical block of 10 mL of 0.25% marcaine was placed.   The Aveta Opal hysteroscope was then inserted into the endometrial cavity, normal saline was used for the hysteroscopic fluid medium.  The cavity distended well with evidence of endometrial polypoid looking endometrial tissue.  The background endometrium appeared fairly normal no submucosal fibroids some displacement of endometrial cavity in the  posterior fundal area from the dominant uterine fibroid.  The resectoscope was placed through the hysteroscope and the polypoid tissue was then removed, as was background endometrial tissue. Procedure was then ended, the instruments removed from the uterus and vagina. The patient tolerated the procedure well.  Blood loss was minimal.    ----------------------------------------------------------------------------     IMPRESSION/PLAN:  Problem List Items Addressed This Visit    None  Visit Diagnoses         Fibroid    -  Primary    Relevant Orders    MR pelvis w and wo  IV contrast      Abnormal uterine bleeding (AUB)        Relevant Medications    BUPivacaine HCl (Marcaine) 0.25 % (2.5 mg/mL) injection 25 mg    Other Relevant Orders    POCT pregnancy, urine manually resulted (Completed)    Surgical Pathology Exam    MR pelvis w and wo IV contrast      Enlarged uterus        Relevant Orders    MR pelvis w and wo IV contrast      Dysmenorrhea               AUB:   Potential etiologies reviewed, suspected diagnosis is uterine fibroids based on US results..  Office hysteroscopy done today reveals endometrial polyps but no submucosal fibroids and fairly normal size endometrial cavity, hysteroscopy with endometrial sampling/polypectomy done today without complication, postprocedure instructions reviewed.     Fibroid:  11.7 cm posterior fundal IM/SS fibroid and 4.0 cm anterior IM on ultrasound.  Her symptoms include AUB (heavy and irregular periods) and dysmenorrhea; bulk symptoms include abdominal swelling and mild constipation.  Childbearing plans:  never.  Discussed all available treatment options including no treatment, hormonal manipulation to treat associated HMB or shrink fibroids  (unlikely to be beneficial given very large uterine fibroid); laparoscopic Acessa ultrasound ablation  or laparoscopic myomectomy (both done by my partner Dr. Nj); or definitive hysterectomy.  Pelvic MRI ordered for preoperative planning.  Patient interested in possible myomectomy.  Handout on Acessa and myomectomy provided to patient.      F/U in 2 to 3 weeks with Dr. Nj for surgery consultation for uterine fibroids.       Pablo Ring DO

## 2025-07-29 LAB
LABORATORY COMMENT REPORT: NORMAL
PATH REPORT.COMMENTS IMP SPEC: NORMAL
PATH REPORT.FINAL DX SPEC: NORMAL
PATH REPORT.GROSS SPEC: NORMAL
PATH REPORT.RELEVANT HX SPEC: NORMAL
PATH REPORT.TOTAL CANCER: NORMAL

## 2025-08-15 ENCOUNTER — APPOINTMENT (OUTPATIENT)
Dept: RADIOLOGY | Facility: HOSPITAL | Age: 40
End: 2025-08-15
Payer: COMMERCIAL

## 2025-08-25 ENCOUNTER — APPOINTMENT (OUTPATIENT)
Dept: OBSTETRICS AND GYNECOLOGY | Facility: CLINIC | Age: 40
End: 2025-08-25
Payer: COMMERCIAL

## 2025-08-27 ENCOUNTER — APPOINTMENT (OUTPATIENT)
Dept: RADIOLOGY | Facility: HOSPITAL | Age: 40
End: 2025-08-27
Payer: COMMERCIAL

## 2025-08-27 DIAGNOSIS — N85.2 ENLARGED UTERUS: ICD-10-CM

## 2025-08-27 DIAGNOSIS — N93.9 ABNORMAL UTERINE BLEEDING (AUB): ICD-10-CM

## 2025-08-27 DIAGNOSIS — D21.9 FIBROID: ICD-10-CM

## 2025-08-27 PROCEDURE — 2550000001 HC RX 255 CONTRASTS: Performed by: OBSTETRICS & GYNECOLOGY

## 2025-08-27 PROCEDURE — 72197 MRI PELVIS W/O & W/DYE: CPT

## 2025-08-27 PROCEDURE — A9575 INJ GADOTERATE MEGLUMI 0.1ML: HCPCS | Performed by: OBSTETRICS & GYNECOLOGY

## 2025-08-27 PROCEDURE — 72197 MRI PELVIS W/O & W/DYE: CPT | Performed by: RADIOLOGY

## 2025-08-27 RX ORDER — GADOTERATE MEGLUMINE 376.9 MG/ML
0.2 INJECTION INTRAVENOUS
Status: COMPLETED | OUTPATIENT
Start: 2025-08-27 | End: 2025-08-27

## 2025-08-27 RX ADMIN — GADOTERATE MEGLUMINE 16 ML: 376.9 INJECTION INTRAVENOUS at 11:43

## 2025-09-04 ENCOUNTER — APPOINTMENT (OUTPATIENT)
Dept: OBSTETRICS AND GYNECOLOGY | Facility: CLINIC | Age: 40
End: 2025-09-04
Payer: COMMERCIAL

## 2025-09-11 ENCOUNTER — APPOINTMENT (OUTPATIENT)
Dept: OBSTETRICS AND GYNECOLOGY | Facility: CLINIC | Age: 40
End: 2025-09-11
Payer: COMMERCIAL

## 2026-06-22 ENCOUNTER — APPOINTMENT (OUTPATIENT)
Dept: PRIMARY CARE | Facility: CLINIC | Age: 41
End: 2026-06-22
Payer: COMMERCIAL